# Patient Record
Sex: FEMALE | Race: WHITE | Employment: OTHER | URBAN - METROPOLITAN AREA
[De-identification: names, ages, dates, MRNs, and addresses within clinical notes are randomized per-mention and may not be internally consistent; named-entity substitution may affect disease eponyms.]

---

## 2017-02-01 ENCOUNTER — LAB ENCOUNTER (OUTPATIENT)
Dept: LAB | Facility: HOSPITAL | Age: 67
End: 2017-02-01
Attending: INTERNAL MEDICINE
Payer: MEDICARE

## 2017-02-01 DIAGNOSIS — E10.9 DIABETES MELLITUS TYPE I (HCC): Primary | ICD-10-CM

## 2017-02-01 DIAGNOSIS — E03.9 MYXEDEMA HEART DISEASE: ICD-10-CM

## 2017-02-01 DIAGNOSIS — I51.9 MYXEDEMA HEART DISEASE: ICD-10-CM

## 2017-02-01 DIAGNOSIS — E78.2 MIXED HYPERLIPIDEMIA: ICD-10-CM

## 2017-02-01 LAB
ALBUMIN SERPL BCP-MCNC: 3.6 G/DL (ref 3.5–4.8)
ALBUMIN/GLOB SERPL: 1.3 {RATIO} (ref 1–2)
ALP SERPL-CCNC: 34 U/L (ref 32–100)
ALT SERPL-CCNC: 27 U/L (ref 14–54)
ANION GAP SERPL CALC-SCNC: 6 MMOL/L (ref 0–18)
AST SERPL-CCNC: 31 U/L (ref 15–41)
BILIRUB SERPL-MCNC: 0.7 MG/DL (ref 0.3–1.2)
BUN SERPL-MCNC: 22 MG/DL (ref 8–20)
BUN/CREAT SERPL: 22.7 (ref 10–20)
CALCIUM SERPL-MCNC: 10 MG/DL (ref 8.5–10.5)
CHLORIDE SERPL-SCNC: 104 MMOL/L (ref 95–110)
CHOLEST SERPL-MCNC: 163 MG/DL (ref 110–200)
CO2 SERPL-SCNC: 30 MMOL/L (ref 22–32)
CREAT SERPL-MCNC: 0.97 MG/DL (ref 0.5–1.5)
GLOBULIN PLAS-MCNC: 2.7 G/DL (ref 2.5–3.7)
GLUCOSE SERPL-MCNC: 205 MG/DL (ref 70–99)
HBA1C MFR BLD: 7.6 % (ref 4–6)
HDLC SERPL-MCNC: 64 MG/DL
LDLC SERPL CALC-MCNC: 89 MG/DL (ref 0–99)
NONHDLC SERPL-MCNC: 99 MG/DL
OSMOLALITY UR CALC.SUM OF ELEC: 299 MOSM/KG (ref 275–295)
POTASSIUM SERPL-SCNC: 4.5 MMOL/L (ref 3.3–5.1)
PROT SERPL-MCNC: 6.3 G/DL (ref 5.9–8.4)
SODIUM SERPL-SCNC: 140 MMOL/L (ref 136–144)
T4 FREE SERPL-MCNC: 1.93 NG/DL (ref 0.58–1.64)
TRIGL SERPL-MCNC: 51 MG/DL (ref 1–149)
TSH SERPL-ACNC: 1.6 UIU/ML (ref 0.34–5.6)

## 2017-02-01 PROCEDURE — 84439 ASSAY OF FREE THYROXINE: CPT

## 2017-02-01 PROCEDURE — 80061 LIPID PANEL: CPT

## 2017-02-01 PROCEDURE — 84443 ASSAY THYROID STIM HORMONE: CPT

## 2017-02-01 PROCEDURE — 83036 HEMOGLOBIN GLYCOSYLATED A1C: CPT

## 2017-02-01 PROCEDURE — 80053 COMPREHEN METABOLIC PANEL: CPT

## 2017-02-01 PROCEDURE — 36415 COLL VENOUS BLD VENIPUNCTURE: CPT

## 2017-08-05 ENCOUNTER — LAB ENCOUNTER (OUTPATIENT)
Dept: LAB | Facility: HOSPITAL | Age: 67
End: 2017-08-05
Attending: INTERNAL MEDICINE
Payer: MEDICARE

## 2017-08-05 DIAGNOSIS — E78.2 MIXED HYPERLIPIDEMIA: ICD-10-CM

## 2017-08-05 DIAGNOSIS — E10.9 DIABETES MELLITUS TYPE I (HCC): Primary | ICD-10-CM

## 2017-08-05 LAB
ALBUMIN SERPL BCP-MCNC: 3.6 G/DL (ref 3.5–4.8)
ALBUMIN/GLOB SERPL: 1.4 {RATIO} (ref 1–2)
ALP SERPL-CCNC: 30 U/L (ref 32–100)
ALT SERPL-CCNC: 23 U/L (ref 14–54)
ANION GAP SERPL CALC-SCNC: 6 MMOL/L (ref 0–18)
AST SERPL-CCNC: 25 U/L (ref 15–41)
BILIRUB SERPL-MCNC: 0.4 MG/DL (ref 0.3–1.2)
BUN SERPL-MCNC: 32 MG/DL (ref 8–20)
BUN/CREAT SERPL: 29.9 (ref 10–20)
CALCIUM SERPL-MCNC: 9.5 MG/DL (ref 8.5–10.5)
CHLORIDE SERPL-SCNC: 101 MMOL/L (ref 95–110)
CHOLEST SERPL-MCNC: 148 MG/DL (ref 110–200)
CO2 SERPL-SCNC: 28 MMOL/L (ref 22–32)
CREAT SERPL-MCNC: 1.07 MG/DL (ref 0.5–1.5)
CREAT UR-MCNC: 45.6 MG/DL
GLOBULIN PLAS-MCNC: 2.5 G/DL (ref 2.5–3.7)
GLUCOSE SERPL-MCNC: 382 MG/DL (ref 70–99)
HBA1C MFR BLD: 7.3 % (ref 4–6)
HDLC SERPL-MCNC: 62 MG/DL
LDLC SERPL CALC-MCNC: 79 MG/DL (ref 0–99)
MICROALBUMIN UR-MCNC: 0.5 MG/DL (ref 0–1.8)
MICROALBUMIN/CREAT UR: 11 MG/G{CREAT} (ref 0–20)
NONHDLC SERPL-MCNC: 86 MG/DL
OSMOLALITY UR CALC.SUM OF ELEC: 303 MOSM/KG (ref 275–295)
POTASSIUM SERPL-SCNC: 4.8 MMOL/L (ref 3.3–5.1)
PROT SERPL-MCNC: 6.1 G/DL (ref 5.9–8.4)
SODIUM SERPL-SCNC: 135 MMOL/L (ref 136–144)
TRIGL SERPL-MCNC: 36 MG/DL (ref 1–149)

## 2017-08-05 PROCEDURE — 36415 COLL VENOUS BLD VENIPUNCTURE: CPT

## 2017-08-05 PROCEDURE — 83036 HEMOGLOBIN GLYCOSYLATED A1C: CPT

## 2017-08-05 PROCEDURE — 82570 ASSAY OF URINE CREATININE: CPT

## 2017-08-05 PROCEDURE — 80061 LIPID PANEL: CPT

## 2017-08-05 PROCEDURE — 82043 UR ALBUMIN QUANTITATIVE: CPT

## 2017-08-05 PROCEDURE — 80053 COMPREHEN METABOLIC PANEL: CPT

## 2018-01-30 ENCOUNTER — LAB ENCOUNTER (OUTPATIENT)
Dept: LAB | Facility: HOSPITAL | Age: 68
End: 2018-01-30
Attending: INTERNAL MEDICINE
Payer: MEDICARE

## 2018-01-30 DIAGNOSIS — E10.9 DIABETES MELLITUS TYPE I (HCC): Primary | ICD-10-CM

## 2018-01-30 DIAGNOSIS — E55.9 AVITAMINOSIS D: ICD-10-CM

## 2018-01-30 DIAGNOSIS — E78.2 MIXED HYPERLIPIDEMIA: ICD-10-CM

## 2018-01-30 DIAGNOSIS — E03.9 HYPOTHYROIDISM, ADULT: ICD-10-CM

## 2018-01-30 LAB
ALBUMIN SERPL BCP-MCNC: 3.9 G/DL (ref 3.5–4.8)
ALBUMIN/GLOB SERPL: 1.6 {RATIO} (ref 1–2)
ALP SERPL-CCNC: 31 U/L (ref 32–100)
ALT SERPL-CCNC: 26 U/L (ref 14–54)
ANION GAP SERPL CALC-SCNC: 7 MMOL/L (ref 0–18)
AST SERPL-CCNC: 31 U/L (ref 15–41)
BILIRUB SERPL-MCNC: 0.5 MG/DL (ref 0.3–1.2)
BUN SERPL-MCNC: 19 MG/DL (ref 8–20)
BUN/CREAT SERPL: 18.4 (ref 10–20)
CALCIUM SERPL-MCNC: 9.9 MG/DL (ref 8.5–10.5)
CHLORIDE SERPL-SCNC: 101 MMOL/L (ref 95–110)
CHOLEST SERPL-MCNC: 163 MG/DL (ref 110–200)
CO2 SERPL-SCNC: 29 MMOL/L (ref 22–32)
CREAT SERPL-MCNC: 1.03 MG/DL (ref 0.5–1.5)
GLOBULIN PLAS-MCNC: 2.4 G/DL (ref 2.5–3.7)
GLUCOSE SERPL-MCNC: 147 MG/DL (ref 70–99)
HBA1C MFR BLD: 7.8 % (ref 4–6)
HDLC SERPL-MCNC: 63 MG/DL
LDLC SERPL CALC-MCNC: 89 MG/DL (ref 0–99)
NONHDLC SERPL-MCNC: 100 MG/DL
OSMOLALITY UR CALC.SUM OF ELEC: 289 MOSM/KG (ref 275–295)
POTASSIUM SERPL-SCNC: 4.3 MMOL/L (ref 3.3–5.1)
PROT SERPL-MCNC: 6.3 G/DL (ref 5.9–8.4)
SODIUM SERPL-SCNC: 137 MMOL/L (ref 136–144)
T4 FREE SERPL-MCNC: 1.72 NG/DL (ref 0.58–1.64)
TRIGL SERPL-MCNC: 54 MG/DL (ref 1–149)
TSH SERPL-ACNC: 0.91 UIU/ML (ref 0.45–5.33)

## 2018-01-30 PROCEDURE — 83036 HEMOGLOBIN GLYCOSYLATED A1C: CPT

## 2018-01-30 PROCEDURE — 82306 VITAMIN D 25 HYDROXY: CPT

## 2018-01-30 PROCEDURE — 80053 COMPREHEN METABOLIC PANEL: CPT

## 2018-01-30 PROCEDURE — 84443 ASSAY THYROID STIM HORMONE: CPT

## 2018-01-30 PROCEDURE — 80061 LIPID PANEL: CPT

## 2018-01-30 PROCEDURE — 84439 ASSAY OF FREE THYROXINE: CPT

## 2018-01-30 PROCEDURE — 36415 COLL VENOUS BLD VENIPUNCTURE: CPT

## 2018-01-31 LAB — 25(OH)D3 SERPL-MCNC: 73.7 NG/ML

## 2018-05-19 ENCOUNTER — HOSPITAL ENCOUNTER (OUTPATIENT)
Dept: MAMMOGRAPHY | Facility: HOSPITAL | Age: 68
Discharge: HOME | End: 2018-05-19
Attending: INTERNAL MEDICINE
Payer: MEDICARE

## 2018-05-19 DIAGNOSIS — Z12.31 ENCOUNTER FOR SCREENING MAMMOGRAM FOR MALIGNANT NEOPLASM OF BREAST: ICD-10-CM

## 2018-05-19 PROCEDURE — 77067 SCR MAMMO BI INCL CAD: CPT | Performed by: INTERNAL MEDICINE

## 2018-05-19 PROCEDURE — 77063 BREAST TOMOSYNTHESIS BI: CPT | Performed by: INTERNAL MEDICINE

## 2018-06-18 ENCOUNTER — HOSPITAL ENCOUNTER (OUTPATIENT)
Dept: ENDOCRINOLOGY | Facility: HOSPITAL | Age: 68
Discharge: HOME | End: 2018-06-18
Attending: INTERNAL MEDICINE
Payer: MEDICARE

## 2018-06-18 DIAGNOSIS — E10.3293 TYPE 1 DIABETES MELLITUS WITH MILD NONPROLIFERATIVE RETINOPATHY OF BOTH EYES WITHOUT MACULAR EDEMA (HCC): Primary | ICD-10-CM

## 2018-06-19 VITALS — WEIGHT: 122.19 LBS | BODY MASS INDEX: 21 KG/M2

## 2018-06-19 NOTE — PROGRESS NOTES
Jolie Hamilton 1/5/1950 attended for Intensive Management:    Date: 6/19/2018 Start Time: 3:15 pm End Time: 5 pm    Wt: Wt Readings from Last 6 Encounters:  06/19/18 : 122 lb 3.2 oz  02/02/18 : 121 lb 14.4 oz  09/27/17 : 125 lb  06/21/17 : 122 lb 3.2 oz Other: Pt was very overwhelmed at this appointment. Her  was very helpful. Educator provided simple guidelines and encouragement. Education:    Reviewed basic pathophysiology of diabetes, some of the differences between type 1 and type 2.

## 2018-06-28 ENCOUNTER — APPOINTMENT (OUTPATIENT)
Dept: ENDOCRINOLOGY | Facility: HOSPITAL | Age: 68
End: 2018-06-28
Attending: INTERNAL MEDICINE
Payer: MEDICARE

## 2018-07-28 ENCOUNTER — LAB ENCOUNTER (OUTPATIENT)
Dept: LAB | Facility: HOSPITAL | Age: 68
End: 2018-07-28
Attending: INTERNAL MEDICINE
Payer: MEDICARE

## 2018-07-28 DIAGNOSIS — E03.9 MYXEDEMA HEART DISEASE: ICD-10-CM

## 2018-07-28 DIAGNOSIS — I51.9 MYXEDEMA HEART DISEASE: ICD-10-CM

## 2018-07-28 DIAGNOSIS — E10.9 DIABETES MELLITUS TYPE I (HCC): Primary | ICD-10-CM

## 2018-07-28 DIAGNOSIS — E78.2 MIXED HYPERLIPIDEMIA: ICD-10-CM

## 2018-07-28 LAB
ALBUMIN SERPL BCP-MCNC: 3.7 G/DL (ref 3.5–4.8)
ALBUMIN/GLOB SERPL: 1.5 {RATIO} (ref 1–2)
ALP SERPL-CCNC: 33 U/L (ref 32–100)
ALT SERPL-CCNC: 29 U/L (ref 14–54)
ANION GAP SERPL CALC-SCNC: 7 MMOL/L (ref 0–18)
AST SERPL-CCNC: 30 U/L (ref 15–41)
BILIRUB SERPL-MCNC: 0.6 MG/DL (ref 0.3–1.2)
BUN SERPL-MCNC: 28 MG/DL (ref 8–20)
BUN/CREAT SERPL: 24.1 (ref 10–20)
CALCIUM SERPL-MCNC: 10 MG/DL (ref 8.5–10.5)
CHLORIDE SERPL-SCNC: 101 MMOL/L (ref 95–110)
CHOLEST SERPL-MCNC: 163 MG/DL (ref 110–200)
CO2 SERPL-SCNC: 28 MMOL/L (ref 22–32)
CREAT SERPL-MCNC: 1.16 MG/DL (ref 0.5–1.5)
CREAT UR-MCNC: 40.4 MG/DL
GLOBULIN PLAS-MCNC: 2.5 G/DL (ref 2.5–3.7)
GLUCOSE SERPL-MCNC: 221 MG/DL (ref 70–99)
HBA1C MFR BLD: 7.8 % (ref 4–6)
HDLC SERPL-MCNC: 62 MG/DL
LDLC SERPL CALC-MCNC: 94 MG/DL (ref 0–99)
MICROALBUMIN UR-MCNC: 2 MG/DL (ref 0–1.8)
MICROALBUMIN/CREAT UR: 49.5 MG/G{CREAT} (ref 0–20)
NONHDLC SERPL-MCNC: 101 MG/DL
OSMOLALITY UR CALC.SUM OF ELEC: 294 MOSM/KG (ref 275–295)
PATIENT FASTING: YES
POTASSIUM SERPL-SCNC: 4.7 MMOL/L (ref 3.3–5.1)
PROT SERPL-MCNC: 6.2 G/DL (ref 5.9–8.4)
SODIUM SERPL-SCNC: 136 MMOL/L (ref 136–144)
TRIGL SERPL-MCNC: 37 MG/DL (ref 1–149)

## 2018-07-28 PROCEDURE — 82043 UR ALBUMIN QUANTITATIVE: CPT

## 2018-07-28 PROCEDURE — 36415 COLL VENOUS BLD VENIPUNCTURE: CPT

## 2018-07-28 PROCEDURE — 80053 COMPREHEN METABOLIC PANEL: CPT

## 2018-07-28 PROCEDURE — 82570 ASSAY OF URINE CREATININE: CPT

## 2018-07-28 PROCEDURE — 83036 HEMOGLOBIN GLYCOSYLATED A1C: CPT

## 2018-07-28 PROCEDURE — 80061 LIPID PANEL: CPT

## 2019-02-09 ENCOUNTER — LAB ENCOUNTER (OUTPATIENT)
Dept: LAB | Facility: HOSPITAL | Age: 69
End: 2019-02-09
Attending: INTERNAL MEDICINE
Payer: MEDICARE

## 2019-02-09 DIAGNOSIS — E10.9 DIABETES MELLITUS TYPE I (HCC): Primary | ICD-10-CM

## 2019-02-09 DIAGNOSIS — Z13.29 ENCOUNTER FOR SCREENING FOR ENDOCRINE DISORDER: ICD-10-CM

## 2019-02-09 DIAGNOSIS — Z13.228 ENCOUNTER FOR SCREENING FOR METABOLIC DISORDER: ICD-10-CM

## 2019-02-09 DIAGNOSIS — E10.3393 TYPE 1 DIABETES MELLITUS WITH MODERATE NONPROLIFERATIVE RETINOPATHY OF BOTH EYES WITHOUT MACULAR EDEMA (HCC): ICD-10-CM

## 2019-02-09 DIAGNOSIS — E10.65 UNCONTROLLED TYPE 1 DIABETES MELLITUS WITH HYPERGLYCEMIA (HCC): ICD-10-CM

## 2019-02-09 LAB
ALBUMIN SERPL BCP-MCNC: 3.7 G/DL (ref 3.5–4.8)
ALBUMIN/GLOB SERPL: 1.5 {RATIO} (ref 1–2)
ALP SERPL-CCNC: 38 U/L (ref 32–100)
ALT SERPL-CCNC: 33 U/L (ref 14–54)
ANION GAP SERPL CALC-SCNC: 8 MMOL/L (ref 0–18)
AST SERPL-CCNC: 32 U/L (ref 15–41)
BILIRUB SERPL-MCNC: 0.6 MG/DL (ref 0.3–1.2)
BUN SERPL-MCNC: 24 MG/DL (ref 8–20)
BUN/CREAT SERPL: 23.5 (ref 10–20)
CALCIUM SERPL-MCNC: 9.9 MG/DL (ref 8.5–10.5)
CHLORIDE SERPL-SCNC: 107 MMOL/L (ref 95–110)
CHOLEST SERPL-MCNC: 148 MG/DL (ref 110–200)
CO2 SERPL-SCNC: 27 MMOL/L (ref 22–32)
CREAT SERPL-MCNC: 1.02 MG/DL (ref 0.5–1.5)
CREAT UR-MCNC: 33.8 MG/DL
EST. AVERAGE GLUCOSE BLD GHB EST-MCNC: 183 MG/DL (ref 68–126)
GLOBULIN PLAS-MCNC: 2.5 G/DL (ref 2.5–3.7)
GLUCOSE SERPL-MCNC: 169 MG/DL (ref 70–99)
HBA1C MFR BLD HPLC: 8 % (ref ?–5.7)
HDLC SERPL-MCNC: 65 MG/DL
LDLC SERPL CALC-MCNC: 75 MG/DL (ref 0–99)
MICROALBUMIN UR-MCNC: 3.2 MG/DL (ref 0–1.8)
MICROALBUMIN/CREAT UR: 94.7 MG/G{CREAT} (ref 0–20)
NONHDLC SERPL-MCNC: 83 MG/DL
OSMOLALITY UR CALC.SUM OF ELEC: 302 MOSM/KG (ref 275–295)
PATIENT FASTING: YES
POTASSIUM SERPL-SCNC: 4.5 MMOL/L (ref 3.3–5.1)
PROT SERPL-MCNC: 6.2 G/DL (ref 5.9–8.4)
SODIUM SERPL-SCNC: 142 MMOL/L (ref 136–144)
TRIGL SERPL-MCNC: 39 MG/DL (ref 1–149)
TSH SERPL-ACNC: 1.82 UIU/ML (ref 0.45–5.33)

## 2019-02-09 PROCEDURE — 85025 COMPLETE CBC W/AUTO DIFF WBC: CPT | Performed by: INTERNAL MEDICINE

## 2019-02-09 PROCEDURE — 82043 UR ALBUMIN QUANTITATIVE: CPT

## 2019-02-09 PROCEDURE — 36415 COLL VENOUS BLD VENIPUNCTURE: CPT

## 2019-02-09 PROCEDURE — 83036 HEMOGLOBIN GLYCOSYLATED A1C: CPT

## 2019-02-09 PROCEDURE — 82570 ASSAY OF URINE CREATININE: CPT

## 2019-02-09 PROCEDURE — 36415 COLL VENOUS BLD VENIPUNCTURE: CPT | Performed by: INTERNAL MEDICINE

## 2019-02-09 PROCEDURE — 80061 LIPID PANEL: CPT

## 2019-02-09 PROCEDURE — 80053 COMPREHEN METABOLIC PANEL: CPT

## 2019-02-09 PROCEDURE — 83036 HEMOGLOBIN GLYCOSYLATED A1C: CPT | Performed by: INTERNAL MEDICINE

## 2019-02-09 PROCEDURE — 84443 ASSAY THYROID STIM HORMONE: CPT

## 2019-06-01 ENCOUNTER — LAB ENCOUNTER (OUTPATIENT)
Dept: LAB | Facility: HOSPITAL | Age: 69
End: 2019-06-01
Attending: INTERNAL MEDICINE
Payer: MEDICARE

## 2019-06-01 DIAGNOSIS — E78.2 MIXED HYPERLIPIDEMIA: ICD-10-CM

## 2019-06-01 DIAGNOSIS — E10.9 DIABETES MELLITUS TYPE I (HCC): Primary | ICD-10-CM

## 2019-06-01 PROCEDURE — 80053 COMPREHEN METABOLIC PANEL: CPT

## 2019-06-01 PROCEDURE — 83036 HEMOGLOBIN GLYCOSYLATED A1C: CPT

## 2019-06-01 PROCEDURE — 36415 COLL VENOUS BLD VENIPUNCTURE: CPT

## 2019-06-01 PROCEDURE — 80061 LIPID PANEL: CPT

## 2019-09-28 ENCOUNTER — HOSPITAL ENCOUNTER (OUTPATIENT)
Dept: MAMMOGRAPHY | Facility: HOSPITAL | Age: 69
Discharge: HOME | End: 2019-09-28
Attending: INTERNAL MEDICINE
Payer: MEDICARE

## 2019-09-28 DIAGNOSIS — Z12.31 SCREENING MAMMOGRAM FOR HIGH-RISK PATIENT: ICD-10-CM

## 2019-09-28 PROCEDURE — 77063 BREAST TOMOSYNTHESIS BI: CPT | Performed by: INTERNAL MEDICINE

## 2019-09-28 PROCEDURE — 77067 SCR MAMMO BI INCL CAD: CPT | Performed by: INTERNAL MEDICINE

## 2019-10-25 ENCOUNTER — HOSPITAL ENCOUNTER (EMERGENCY)
Facility: HOSPITAL | Age: 69
Discharge: HOME | End: 2019-10-25
Attending: PHYSICIAN ASSISTANT
Payer: MEDICARE

## 2019-10-25 VITALS
WEIGHT: 123.44 LBS | BODY MASS INDEX: 21 KG/M2 | RESPIRATION RATE: 16 BRPM | OXYGEN SATURATION: 99 % | DIASTOLIC BLOOD PRESSURE: 66 MMHG | TEMPERATURE: 99 F | SYSTOLIC BLOOD PRESSURE: 157 MMHG | HEART RATE: 60 BPM

## 2019-10-25 DIAGNOSIS — S61.211A LACERATION OF LEFT INDEX FINGER WITHOUT FOREIGN BODY WITHOUT DAMAGE TO NAIL, INITIAL ENCOUNTER: Primary | ICD-10-CM

## 2019-10-25 DIAGNOSIS — R03.0 ELEVATED BLOOD PRESSURE READING: ICD-10-CM

## 2019-10-25 PROCEDURE — 90471 IMMUNIZATION ADMIN: CPT

## 2019-10-25 PROCEDURE — 99283 EMERGENCY DEPT VISIT LOW MDM: CPT

## 2019-10-25 PROCEDURE — 12001 RPR S/N/AX/GEN/TRNK 2.5CM/<: CPT

## 2019-10-25 RX ORDER — CEPHALEXIN 500 MG/1
500 CAPSULE ORAL 3 TIMES DAILY
Qty: 15 CAPSULE | Refills: 0 | Status: SHIPPED | OUTPATIENT
Start: 2019-10-25 | End: 2019-10-30

## 2019-10-25 NOTE — ED NOTES
Assumed care of patient from triage. Patient to ED from home for laceration. Patient states that she was cutting vegetables with knife when she accidentally cut her left second finger. Wound asepsis performed by Luis Armando Kessler. ERT.  Laceration covered with 4x4'

## 2019-10-25 NOTE — ED PROVIDER NOTES
Patient Seen in: [facility] AND [facility] Emergency Department    History   Patient presents with:  Laceration Abrasion (integumentary)    Stated Complaint:     HPI      HPI:     68year old female who presents with chief complaint of laceration of left index f EVERY DAY   furosemide 20 MG Oral Tab,  Take 20 mg by mouth as needed. Insulin Aspart (NOVOLOG FLEXPEN SC),  Inject into the skin. Sliding scale   Cholecalciferol (HM VITAMIN D3) 4000 units Oral Cap,  Take 1 capsule by mouth every 7 days.    Vitamin B-12 Capillary refill is brisk. No extremity edema. Gastrointestinal: Abdomen soft, nontender, nondistended. There is no rebound tenderness or guarding. No organomegaly is noted. No guarding or peritoneal signs. Genitourinary: Not examined.   Lymphatic: No Education regarding lifestyle modifications and the need for appropriate follow-up with their PCP to have their blood pressure re-checked within 24-48 hours was provided. Patient case discussed with and patient seen by Dr. Mai Almanza.     Disposition and P

## 2020-01-04 ENCOUNTER — LAB ENCOUNTER (OUTPATIENT)
Dept: LAB | Facility: HOSPITAL | Age: 70
End: 2020-01-04
Attending: INTERNAL MEDICINE
Payer: MEDICARE

## 2020-01-04 DIAGNOSIS — E55.9 VITAMIN D DEFICIENCY: ICD-10-CM

## 2020-01-04 DIAGNOSIS — E10.9 DIABETES MELLITUS TYPE I (HCC): ICD-10-CM

## 2020-01-04 DIAGNOSIS — E78.2 MIXED HYPERLIPIDEMIA: ICD-10-CM

## 2020-01-04 DIAGNOSIS — I51.9 MYXEDEMA HEART DISEASE: Primary | ICD-10-CM

## 2020-01-04 DIAGNOSIS — E03.9 MYXEDEMA HEART DISEASE: Primary | ICD-10-CM

## 2020-01-04 LAB
ALBUMIN SERPL-MCNC: 3.7 G/DL (ref 3.4–5)
ALBUMIN/GLOB SERPL: 1.2 {RATIO} (ref 1–2)
ALP LIVER SERPL-CCNC: 38 U/L (ref 55–142)
ALT SERPL-CCNC: 38 U/L (ref 13–56)
ANION GAP SERPL CALC-SCNC: 5 MMOL/L (ref 0–18)
AST SERPL-CCNC: 40 U/L (ref 15–37)
BILIRUB SERPL-MCNC: 0.3 MG/DL (ref 0.1–2)
BUN BLD-MCNC: 27 MG/DL (ref 7–18)
BUN/CREAT SERPL: 27.8 (ref 10–20)
CALCIUM BLD-MCNC: 10.1 MG/DL (ref 8.5–10.1)
CHLORIDE SERPL-SCNC: 107 MMOL/L (ref 98–112)
CHOLEST SMN-MCNC: 154 MG/DL (ref ?–200)
CO2 SERPL-SCNC: 30 MMOL/L (ref 21–32)
CREAT BLD-MCNC: 0.97 MG/DL (ref 0.55–1.02)
CREAT UR-SCNC: 36.8 MG/DL
GLOBULIN PLAS-MCNC: 3 G/DL (ref 2.8–4.4)
GLUCOSE BLD-MCNC: 135 MG/DL (ref 70–99)
HDLC SERPL-MCNC: 74 MG/DL (ref 40–59)
LDLC SERPL CALC-MCNC: 72 MG/DL (ref ?–100)
M PROTEIN MFR SERPL ELPH: 6.7 G/DL (ref 6.4–8.2)
MICROALBUMIN UR-MCNC: 8.32 MG/DL
MICROALBUMIN/CREAT 24H UR-RTO: 226.1 UG/MG (ref ?–30)
NONHDLC SERPL-MCNC: 80 MG/DL (ref ?–130)
OSMOLALITY SERPL CALC.SUM OF ELEC: 301 MOSM/KG (ref 275–295)
PATIENT FASTING Y/N/NP: YES
PATIENT FASTING Y/N/NP: YES
POTASSIUM SERPL-SCNC: 3.9 MMOL/L (ref 3.5–5.1)
SODIUM SERPL-SCNC: 142 MMOL/L (ref 136–145)
T4 FREE SERPL-MCNC: 1.1 NG/DL (ref 0.8–1.7)
TRIGL SERPL-MCNC: 41 MG/DL (ref 30–149)
TSI SER-ACNC: 3.07 MIU/ML (ref 0.36–3.74)
VLDLC SERPL CALC-MCNC: 8 MG/DL (ref 0–30)

## 2020-01-04 PROCEDURE — 82306 VITAMIN D 25 HYDROXY: CPT

## 2020-01-04 PROCEDURE — 84443 ASSAY THYROID STIM HORMONE: CPT

## 2020-01-04 PROCEDURE — 80053 COMPREHEN METABOLIC PANEL: CPT

## 2020-01-04 PROCEDURE — 84439 ASSAY OF FREE THYROXINE: CPT

## 2020-01-04 PROCEDURE — 82043 UR ALBUMIN QUANTITATIVE: CPT

## 2020-01-04 PROCEDURE — 36415 COLL VENOUS BLD VENIPUNCTURE: CPT

## 2020-01-04 PROCEDURE — 82570 ASSAY OF URINE CREATININE: CPT

## 2020-01-04 PROCEDURE — 80061 LIPID PANEL: CPT

## 2020-01-06 LAB — 25(OH)D3 SERPL-MCNC: 84.7 NG/ML (ref 30–100)

## 2020-01-08 ENCOUNTER — APPOINTMENT (OUTPATIENT)
Dept: LAB | Facility: HOSPITAL | Age: 70
End: 2020-01-08
Attending: INTERNAL MEDICINE
Payer: MEDICARE

## 2020-01-08 DIAGNOSIS — I10 ESSENTIAL HYPERTENSION: ICD-10-CM

## 2020-01-08 DIAGNOSIS — E10.3293 TYPE 1 DIABETES MELLITUS WITH MILD NONPROLIFERATIVE RETINOPATHY OF BOTH EYES WITHOUT MACULAR EDEMA (HCC): ICD-10-CM

## 2020-01-08 LAB
ALBUMIN SERPL-MCNC: 3.6 G/DL (ref 3.4–5)
ALBUMIN/GLOB SERPL: 1.2 {RATIO} (ref 1–2)
ALP LIVER SERPL-CCNC: 38 U/L (ref 55–142)
ALT SERPL-CCNC: 38 U/L (ref 13–56)
ANION GAP SERPL CALC-SCNC: 4 MMOL/L (ref 0–18)
AST SERPL-CCNC: 27 U/L (ref 15–37)
BILIRUB SERPL-MCNC: 0.3 MG/DL (ref 0.1–2)
BUN BLD-MCNC: 26 MG/DL (ref 7–18)
BUN/CREAT SERPL: 29.9 (ref 10–20)
CALCIUM BLD-MCNC: 9.6 MG/DL (ref 8.5–10.1)
CHLORIDE SERPL-SCNC: 107 MMOL/L (ref 98–112)
CO2 SERPL-SCNC: 29 MMOL/L (ref 21–32)
CREAT BLD-MCNC: 0.87 MG/DL (ref 0.55–1.02)
EST. AVERAGE GLUCOSE BLD GHB EST-MCNC: 177 MG/DL (ref 68–126)
GLOBULIN PLAS-MCNC: 2.9 G/DL (ref 2.8–4.4)
GLUCOSE BLD-MCNC: 107 MG/DL (ref 70–99)
HBA1C MFR BLD HPLC: 7.8 % (ref ?–5.7)
M PROTEIN MFR SERPL ELPH: 6.5 G/DL (ref 6.4–8.2)
OSMOLALITY SERPL CALC.SUM OF ELEC: 295 MOSM/KG (ref 275–295)
PATIENT FASTING Y/N/NP: YES
POTASSIUM SERPL-SCNC: 4.2 MMOL/L (ref 3.5–5.1)
SODIUM SERPL-SCNC: 140 MMOL/L (ref 136–145)
TSI SER-ACNC: 1.85 MIU/ML (ref 0.36–3.74)

## 2020-01-08 PROCEDURE — 36415 COLL VENOUS BLD VENIPUNCTURE: CPT

## 2020-01-08 PROCEDURE — 80053 COMPREHEN METABOLIC PANEL: CPT

## 2020-01-08 PROCEDURE — 84443 ASSAY THYROID STIM HORMONE: CPT

## 2020-01-08 PROCEDURE — 83036 HEMOGLOBIN GLYCOSYLATED A1C: CPT

## 2020-07-29 ENCOUNTER — LAB ENCOUNTER (OUTPATIENT)
Dept: LAB | Facility: HOSPITAL | Age: 70
End: 2020-07-29
Attending: INTERNAL MEDICINE
Payer: MEDICARE

## 2020-07-29 DIAGNOSIS — E78.2 MIXED HYPERLIPIDEMIA: ICD-10-CM

## 2020-07-29 DIAGNOSIS — E06.3 HYPOTHYROIDISM DUE TO HASHIMOTO'S THYROIDITIS: ICD-10-CM

## 2020-07-29 DIAGNOSIS — E03.8 HYPOTHYROIDISM DUE TO HASHIMOTO'S THYROIDITIS: ICD-10-CM

## 2020-07-29 DIAGNOSIS — E10.9 DIABETES MELLITUS TYPE I (HCC): Primary | ICD-10-CM

## 2020-07-29 LAB
ALBUMIN SERPL-MCNC: 3.7 G/DL (ref 3.4–5)
ALBUMIN/GLOB SERPL: 1.2 {RATIO} (ref 1–2)
ALP LIVER SERPL-CCNC: 41 U/L (ref 55–142)
ALT SERPL-CCNC: 32 U/L (ref 13–56)
ANION GAP SERPL CALC-SCNC: 2 MMOL/L (ref 0–18)
AST SERPL-CCNC: 26 U/L (ref 15–37)
BILIRUB SERPL-MCNC: 0.4 MG/DL (ref 0.1–2)
BUN BLD-MCNC: 28 MG/DL (ref 7–18)
BUN/CREAT SERPL: 28.9 (ref 10–20)
CALCIUM BLD-MCNC: 9.6 MG/DL (ref 8.5–10.1)
CHLORIDE SERPL-SCNC: 107 MMOL/L (ref 98–112)
CHOLEST SMN-MCNC: 146 MG/DL (ref ?–200)
CO2 SERPL-SCNC: 31 MMOL/L (ref 21–32)
CREAT BLD-MCNC: 0.97 MG/DL (ref 0.55–1.02)
CREAT UR-SCNC: 37.4 MG/DL
EST. AVERAGE GLUCOSE BLD GHB EST-MCNC: 169 MG/DL (ref 68–126)
GLOBULIN PLAS-MCNC: 3 G/DL (ref 2.8–4.4)
GLUCOSE BLD-MCNC: 130 MG/DL (ref 70–99)
HBA1C MFR BLD HPLC: 7.5 % (ref ?–5.7)
HDLC SERPL-MCNC: 66 MG/DL (ref 40–59)
LDLC SERPL CALC-MCNC: 72 MG/DL (ref ?–100)
M PROTEIN MFR SERPL ELPH: 6.7 G/DL (ref 6.4–8.2)
MICROALBUMIN UR-MCNC: 7.38 MG/DL
MICROALBUMIN/CREAT 24H UR-RTO: 197.3 UG/MG (ref ?–30)
NONHDLC SERPL-MCNC: 80 MG/DL (ref ?–130)
OSMOLALITY SERPL CALC.SUM OF ELEC: 297 MOSM/KG (ref 275–295)
PATIENT FASTING Y/N/NP: YES
PATIENT FASTING Y/N/NP: YES
POTASSIUM SERPL-SCNC: 4.4 MMOL/L (ref 3.5–5.1)
SODIUM SERPL-SCNC: 140 MMOL/L (ref 136–145)
T4 FREE SERPL-MCNC: 1.3 NG/DL (ref 0.8–1.7)
TRIGL SERPL-MCNC: 41 MG/DL (ref 30–149)
TSI SER-ACNC: 1.53 MIU/ML (ref 0.36–3.74)
VLDLC SERPL CALC-MCNC: 8 MG/DL (ref 0–30)

## 2020-07-29 PROCEDURE — 82570 ASSAY OF URINE CREATININE: CPT

## 2020-07-29 PROCEDURE — 80061 LIPID PANEL: CPT

## 2020-07-29 PROCEDURE — 80053 COMPREHEN METABOLIC PANEL: CPT

## 2020-07-29 PROCEDURE — 36415 COLL VENOUS BLD VENIPUNCTURE: CPT

## 2020-07-29 PROCEDURE — 83036 HEMOGLOBIN GLYCOSYLATED A1C: CPT

## 2020-07-29 PROCEDURE — 84439 ASSAY OF FREE THYROXINE: CPT

## 2020-07-29 PROCEDURE — 82043 UR ALBUMIN QUANTITATIVE: CPT

## 2020-07-29 PROCEDURE — 84443 ASSAY THYROID STIM HORMONE: CPT

## 2020-11-21 ENCOUNTER — HOSPITAL ENCOUNTER (OUTPATIENT)
Dept: MAMMOGRAPHY | Facility: HOSPITAL | Age: 70
Discharge: HOME | End: 2020-11-21
Attending: INTERNAL MEDICINE
Payer: MEDICARE

## 2020-11-21 DIAGNOSIS — Z12.31 ENCOUNTER FOR SCREENING MAMMOGRAM FOR MALIGNANT NEOPLASM OF BREAST: ICD-10-CM

## 2020-11-21 PROCEDURE — 77063 BREAST TOMOSYNTHESIS BI: CPT | Performed by: INTERNAL MEDICINE

## 2020-11-21 PROCEDURE — 77067 SCR MAMMO BI INCL CAD: CPT | Performed by: INTERNAL MEDICINE

## 2021-01-14 ENCOUNTER — HOSPITAL ENCOUNTER (OUTPATIENT)
Dept: GENERAL RADIOLOGY | Facility: HOSPITAL | Age: 71
Discharge: HOME | End: 2021-01-14
Attending: INTERNAL MEDICINE
Payer: MEDICARE

## 2021-01-14 DIAGNOSIS — M25.552 LEFT HIP PAIN: ICD-10-CM

## 2021-01-16 ENCOUNTER — LAB ENCOUNTER (OUTPATIENT)
Dept: LAB | Facility: HOSPITAL | Age: 71
End: 2021-01-16
Attending: INTERNAL MEDICINE
Payer: MEDICARE

## 2021-01-16 DIAGNOSIS — E78.5 HYPERLIPIDEMIA: ICD-10-CM

## 2021-01-16 DIAGNOSIS — E03.9 MYXEDEMA HEART DISEASE: ICD-10-CM

## 2021-01-16 DIAGNOSIS — I51.9 MYXEDEMA HEART DISEASE: ICD-10-CM

## 2021-01-16 DIAGNOSIS — E10.9 DIABETES MELLITUS TYPE I (HCC): Primary | ICD-10-CM

## 2021-01-16 LAB
ALBUMIN SERPL-MCNC: 3.5 G/DL (ref 3.4–5)
ALBUMIN/GLOB SERPL: 1.2 {RATIO} (ref 1–2)
ALP LIVER SERPL-CCNC: 37 U/L
ALT SERPL-CCNC: 33 U/L
ANION GAP SERPL CALC-SCNC: 1 MMOL/L (ref 0–18)
AST SERPL-CCNC: 27 U/L (ref 15–37)
BILIRUB SERPL-MCNC: 0.3 MG/DL (ref 0.1–2)
BUN BLD-MCNC: 23 MG/DL (ref 7–18)
BUN/CREAT SERPL: 25.3 (ref 10–20)
CALCIUM BLD-MCNC: 9.9 MG/DL (ref 8.5–10.1)
CHLORIDE SERPL-SCNC: 109 MMOL/L (ref 98–112)
CHOLEST SMN-MCNC: 128 MG/DL (ref ?–200)
CO2 SERPL-SCNC: 30 MMOL/L (ref 21–32)
CREAT BLD-MCNC: 0.91 MG/DL
EST. AVERAGE GLUCOSE BLD GHB EST-MCNC: 166 MG/DL (ref 68–126)
GLOBULIN PLAS-MCNC: 2.9 G/DL (ref 2.8–4.4)
GLUCOSE BLD-MCNC: 97 MG/DL (ref 70–99)
HBA1C MFR BLD HPLC: 7.4 % (ref ?–5.7)
HDLC SERPL-MCNC: 67 MG/DL (ref 40–59)
LDLC SERPL CALC-MCNC: 53 MG/DL (ref ?–100)
M PROTEIN MFR SERPL ELPH: 6.4 G/DL (ref 6.4–8.2)
NONHDLC SERPL-MCNC: 61 MG/DL (ref ?–130)
OSMOLALITY SERPL CALC.SUM OF ELEC: 294 MOSM/KG (ref 275–295)
PATIENT FASTING Y/N/NP: YES
PATIENT FASTING Y/N/NP: YES
POTASSIUM SERPL-SCNC: 4.3 MMOL/L (ref 3.5–5.1)
SODIUM SERPL-SCNC: 140 MMOL/L (ref 136–145)
T4 FREE SERPL-MCNC: 1.2 NG/DL (ref 0.8–1.7)
TRIGL SERPL-MCNC: 40 MG/DL (ref 30–149)
TSI SER-ACNC: 2.52 MIU/ML (ref 0.36–3.74)
VLDLC SERPL CALC-MCNC: 8 MG/DL (ref 0–30)

## 2021-01-16 PROCEDURE — 36415 COLL VENOUS BLD VENIPUNCTURE: CPT

## 2021-01-16 PROCEDURE — 80053 COMPREHEN METABOLIC PANEL: CPT

## 2021-01-16 PROCEDURE — 84443 ASSAY THYROID STIM HORMONE: CPT

## 2021-01-16 PROCEDURE — 83036 HEMOGLOBIN GLYCOSYLATED A1C: CPT

## 2021-01-16 PROCEDURE — 84439 ASSAY OF FREE THYROXINE: CPT

## 2021-01-16 PROCEDURE — 80061 LIPID PANEL: CPT

## 2021-01-21 ENCOUNTER — HOSPITAL ENCOUNTER (OUTPATIENT)
Dept: GENERAL RADIOLOGY | Facility: HOSPITAL | Age: 71
Discharge: HOME | End: 2021-01-21
Attending: INTERNAL MEDICINE
Payer: MEDICARE

## 2021-01-21 PROCEDURE — 73502 X-RAY EXAM HIP UNI 2-3 VIEWS: CPT | Performed by: INTERNAL MEDICINE

## 2021-07-12 ENCOUNTER — HOSPITAL ENCOUNTER (EMERGENCY)
Facility: HOSPITAL | Age: 71
Discharge: HOME | End: 2021-07-12
Attending: EMERGENCY MEDICINE
Payer: MEDICARE

## 2021-07-12 VITALS
HEIGHT: 63 IN | DIASTOLIC BLOOD PRESSURE: 60 MMHG | TEMPERATURE: 99 F | HEART RATE: 95 BPM | RESPIRATION RATE: 26 BRPM | SYSTOLIC BLOOD PRESSURE: 150 MMHG | WEIGHT: 125 LBS | BODY MASS INDEX: 22.15 KG/M2 | OXYGEN SATURATION: 96 %

## 2021-07-12 DIAGNOSIS — R73.9 HYPERGLYCEMIA: Primary | ICD-10-CM

## 2021-07-12 LAB
ANION GAP SERPL CALC-SCNC: 9 MMOL/L (ref 0–18)
BASOPHILS # BLD AUTO: 0.04 X10(3) UL (ref 0–0.2)
BASOPHILS NFR BLD AUTO: 0.3 %
BILIRUB UR QL: NEGATIVE
BUN BLD-MCNC: 20 MG/DL (ref 7–18)
BUN/CREAT SERPL: 16.9 (ref 10–20)
CALCIUM BLD-MCNC: 10.1 MG/DL (ref 8.5–10.1)
CHLORIDE SERPL-SCNC: 97 MMOL/L (ref 98–112)
CLARITY UR: CLEAR
CO2 SERPL-SCNC: 26 MMOL/L (ref 21–32)
COLOR UR: YELLOW
CREAT BLD-MCNC: 1.18 MG/DL
DEPRECATED RDW RBC AUTO: 45.7 FL (ref 35.1–46.3)
EOSINOPHIL # BLD AUTO: 0 X10(3) UL (ref 0–0.7)
EOSINOPHIL NFR BLD AUTO: 0 %
ERYTHROCYTE [DISTWIDTH] IN BLOOD BY AUTOMATED COUNT: 13.3 % (ref 11–15)
GLUCOSE BLD-MCNC: 334 MG/DL (ref 70–99)
GLUCOSE BLDC GLUCOMTR-MCNC: 284 MG/DL (ref 70–99)
GLUCOSE BLDC GLUCOMTR-MCNC: 291 MG/DL (ref 70–99)
GLUCOSE BLDC GLUCOMTR-MCNC: 318 MG/DL (ref 70–99)
GLUCOSE BLDC GLUCOMTR-MCNC: 366 MG/DL (ref 70–99)
GLUCOSE UR-MCNC: >=500 MG/DL
HCT VFR BLD AUTO: 37 %
HGB BLD-MCNC: 12.2 G/DL
HGB UR QL STRIP.AUTO: NEGATIVE
IMM GRANULOCYTES # BLD AUTO: 0.05 X10(3) UL (ref 0–1)
IMM GRANULOCYTES NFR BLD: 0.4 %
KETONES UR-MCNC: 20 MG/DL
LYMPHOCYTES # BLD AUTO: 1.29 X10(3) UL (ref 1–4)
LYMPHOCYTES NFR BLD AUTO: 9.1 %
MCH RBC QN AUTO: 30.7 PG (ref 26–34)
MCHC RBC AUTO-ENTMCNC: 33 G/DL (ref 31–37)
MCV RBC AUTO: 93.2 FL
MONOCYTES # BLD AUTO: 1.05 X10(3) UL (ref 0.1–1)
MONOCYTES NFR BLD AUTO: 7.4 %
NEUTROPHILS # BLD AUTO: 11.8 X10 (3) UL (ref 1.5–7.7)
NEUTROPHILS # BLD AUTO: 11.8 X10(3) UL (ref 1.5–7.7)
NEUTROPHILS NFR BLD AUTO: 82.8 %
NITRITE UR QL STRIP.AUTO: NEGATIVE
OSMOLALITY SERPL CALC.SUM OF ELEC: 290 MOSM/KG (ref 275–295)
PH UR: 6 [PH] (ref 5–8)
PLATELET # BLD AUTO: 227 10(3)UL (ref 150–450)
POTASSIUM SERPL-SCNC: 3.7 MMOL/L (ref 3.5–5.1)
PROT UR-MCNC: 100 MG/DL
RBC # BLD AUTO: 3.97 X10(6)UL
SODIUM SERPL-SCNC: 132 MMOL/L (ref 136–145)
SP GR UR STRIP: 1.01 (ref 1–1.03)
UROBILINOGEN UR STRIP-ACNC: <2
WBC # BLD AUTO: 14.2 X10(3) UL (ref 4–11)

## 2021-07-12 PROCEDURE — 96376 TX/PRO/DX INJ SAME DRUG ADON: CPT

## 2021-07-12 PROCEDURE — 81001 URINALYSIS AUTO W/SCOPE: CPT | Performed by: EMERGENCY MEDICINE

## 2021-07-12 PROCEDURE — 96361 HYDRATE IV INFUSION ADD-ON: CPT

## 2021-07-12 PROCEDURE — 82962 GLUCOSE BLOOD TEST: CPT

## 2021-07-12 PROCEDURE — 85025 COMPLETE CBC W/AUTO DIFF WBC: CPT | Performed by: EMERGENCY MEDICINE

## 2021-07-12 PROCEDURE — 80048 BASIC METABOLIC PNL TOTAL CA: CPT | Performed by: EMERGENCY MEDICINE

## 2021-07-12 PROCEDURE — 96374 THER/PROPH/DIAG INJ IV PUSH: CPT

## 2021-07-12 PROCEDURE — 99285 EMERGENCY DEPT VISIT HI MDM: CPT

## 2021-07-12 RX ORDER — INSULIN ASPART 100 [IU]/ML
6 INJECTION, SOLUTION INTRAVENOUS; SUBCUTANEOUS ONCE
Status: COMPLETED | OUTPATIENT
Start: 2021-07-12 | End: 2021-07-12

## 2021-07-14 NOTE — ED PROVIDER NOTES
Patient Seen in: [facility] Emergency Department    History   Patient presents with:  Hyperglycemia      HPI    The patient presents to the ED complaining of cold symptoms for the past several days including congestion, fatigue and decreased appet today, pertinent positives to the presenting problem noted.     Physical Exam     ED Triage Vitals [07/12/21 1817]   /66   Pulse 92   Resp 16   Temp 98.9 °F (37.2 °C)   Temp src Oral   SpO2 97 %   O2 Device None (Room air)       All measures to preven Result Value    Glucose 334 (*)     Sodium 132 (*)     Chloride 97 (*)     BUN 20 (*)     Creatinine 1.18 (*)     GFR, Non- 46 (*)     GFR, -American 53 (*)     All other components within normal limits   URINALYSIS WITH CULTURE REFL Medications   sodium chloride 0.9% IV bolus 1,000 mL (0 mL Intravenous Stopped 7/12/21 2248)   Insulin Regular Human (NOVOLIN R) 100 UNIT/ML injection 5 Units (5 Units Intravenous Given 7/12/21 1919)   sodium chloride 0.9% IV bolus 1,000 mL (0 mL [facility] worse.    Condition upon leaving the department: Stable    Disposition and Plan     Clinical Impression:  Hyperglycemia  (primary encounter diagnosis)    Disposition:  Discharge    Follow-up:  Dwayne Ramirez MD  [address]  [address]

## 2021-07-24 ENCOUNTER — LAB ENCOUNTER (OUTPATIENT)
Dept: LAB | Facility: HOSPITAL | Age: 71
End: 2021-07-24
Attending: INTERNAL MEDICINE
Payer: MEDICARE

## 2021-07-24 DIAGNOSIS — E55.9 VITAMIN D DEFICIENCY: ICD-10-CM

## 2021-07-24 DIAGNOSIS — E10.9 DIABETES MELLITUS TYPE I (HCC): Primary | ICD-10-CM

## 2021-07-24 DIAGNOSIS — E03.9 HYPOTHYROIDISM: ICD-10-CM

## 2021-07-24 LAB
ALBUMIN SERPL-MCNC: 3.4 G/DL (ref 3.4–5)
ALBUMIN/GLOB SERPL: 1.1 {RATIO} (ref 1–2)
ALP LIVER SERPL-CCNC: 41 U/L
ALT SERPL-CCNC: 34 U/L
ANION GAP SERPL CALC-SCNC: 6 MMOL/L (ref 0–18)
AST SERPL-CCNC: 27 U/L (ref 15–37)
BILIRUB SERPL-MCNC: 0.3 MG/DL (ref 0.1–2)
BUN BLD-MCNC: 20 MG/DL (ref 7–18)
BUN/CREAT SERPL: 21.3 (ref 10–20)
CALCIUM BLD-MCNC: 10.1 MG/DL (ref 8.5–10.1)
CHLORIDE SERPL-SCNC: 101 MMOL/L (ref 98–112)
CHOLEST SMN-MCNC: 148 MG/DL (ref ?–200)
CO2 SERPL-SCNC: 28 MMOL/L (ref 21–32)
CREAT BLD-MCNC: 0.94 MG/DL
CREAT UR-SCNC: 66.9 MG/DL
EST. AVERAGE GLUCOSE BLD GHB EST-MCNC: 177 MG/DL (ref 68–126)
GLOBULIN PLAS-MCNC: 3 G/DL (ref 2.8–4.4)
GLUCOSE BLD-MCNC: 70 MG/DL (ref 70–99)
HBA1C MFR BLD HPLC: 7.8 % (ref ?–5.7)
HDLC SERPL-MCNC: 59 MG/DL (ref 40–59)
LDLC SERPL CALC-MCNC: 80 MG/DL (ref ?–100)
M PROTEIN MFR SERPL ELPH: 6.4 G/DL (ref 6.4–8.2)
MICROALBUMIN UR-MCNC: 10.3 MG/DL
MICROALBUMIN/CREAT 24H UR-RTO: 154 UG/MG (ref ?–30)
NONHDLC SERPL-MCNC: 89 MG/DL (ref ?–130)
OSMOLALITY SERPL CALC.SUM OF ELEC: 281 MOSM/KG (ref 275–295)
PATIENT FASTING Y/N/NP: YES
PATIENT FASTING Y/N/NP: YES
POTASSIUM SERPL-SCNC: 4.2 MMOL/L (ref 3.5–5.1)
SODIUM SERPL-SCNC: 135 MMOL/L (ref 136–145)
T4 FREE SERPL-MCNC: 1.2 NG/DL (ref 0.8–1.7)
TRIGL SERPL-MCNC: 38 MG/DL (ref 30–149)
TSI SER-ACNC: 1.06 MIU/ML (ref 0.36–3.74)
VLDLC SERPL CALC-MCNC: 6 MG/DL (ref 0–30)

## 2021-07-24 PROCEDURE — 84439 ASSAY OF FREE THYROXINE: CPT

## 2021-07-24 PROCEDURE — 82306 VITAMIN D 25 HYDROXY: CPT

## 2021-07-24 PROCEDURE — 82570 ASSAY OF URINE CREATININE: CPT

## 2021-07-24 PROCEDURE — 84443 ASSAY THYROID STIM HORMONE: CPT

## 2021-07-24 PROCEDURE — 80061 LIPID PANEL: CPT

## 2021-07-24 PROCEDURE — 82043 UR ALBUMIN QUANTITATIVE: CPT

## 2021-07-24 PROCEDURE — 36415 COLL VENOUS BLD VENIPUNCTURE: CPT

## 2021-07-24 PROCEDURE — 80053 COMPREHEN METABOLIC PANEL: CPT

## 2021-07-24 PROCEDURE — 83036 HEMOGLOBIN GLYCOSYLATED A1C: CPT

## 2021-07-26 LAB — 25(OH)D3 SERPL-MCNC: 121 NG/ML (ref 30–100)

## 2022-01-24 ENCOUNTER — HOSPITAL ENCOUNTER (OUTPATIENT)
Dept: MAMMOGRAPHY | Facility: HOSPITAL | Age: 72
Discharge: HOME | End: 2022-01-24
Attending: INTERNAL MEDICINE
Payer: MEDICARE

## 2022-01-24 DIAGNOSIS — Z12.31 ENCOUNTER FOR SCREENING MAMMOGRAM FOR MALIGNANT NEOPLASM OF BREAST: ICD-10-CM

## 2022-01-24 PROCEDURE — 77063 BREAST TOMOSYNTHESIS BI: CPT | Performed by: INTERNAL MEDICINE

## 2022-01-24 PROCEDURE — 77067 SCR MAMMO BI INCL CAD: CPT | Performed by: INTERNAL MEDICINE

## 2022-02-07 ENCOUNTER — LAB ENCOUNTER (OUTPATIENT)
Dept: LAB | Facility: HOSPITAL | Age: 72
End: 2022-02-07
Attending: INTERNAL MEDICINE
Payer: MEDICARE

## 2022-02-07 DIAGNOSIS — E78.2 MIXED HYPERLIPIDEMIA: ICD-10-CM

## 2022-02-07 DIAGNOSIS — E10.9 TYPE 1 DIABETES MELLITUS (HCC): Primary | ICD-10-CM

## 2022-02-07 LAB
ALBUMIN SERPL-MCNC: 3.7 G/DL (ref 3.4–5)
ALBUMIN/GLOB SERPL: 1.3 {RATIO} (ref 1–2)
ALP LIVER SERPL-CCNC: 47 U/L
ALT SERPL-CCNC: 31 U/L
ANION GAP SERPL CALC-SCNC: 4 MMOL/L (ref 0–18)
AST SERPL-CCNC: 25 U/L (ref 15–37)
BILIRUB SERPL-MCNC: 0.4 MG/DL (ref 0.1–2)
BUN BLD-MCNC: 26 MG/DL (ref 7–18)
BUN/CREAT SERPL: 29.2 (ref 10–20)
CALCIUM BLD-MCNC: 10.2 MG/DL (ref 8.5–10.1)
CHLORIDE SERPL-SCNC: 104 MMOL/L (ref 98–112)
CHOLEST SERPL-MCNC: 167 MG/DL (ref ?–200)
CO2 SERPL-SCNC: 28 MMOL/L (ref 21–32)
CREAT BLD-MCNC: 0.89 MG/DL
CREAT UR-SCNC: 98.5 MG/DL
EST. AVERAGE GLUCOSE BLD GHB EST-MCNC: 174 MG/DL (ref 68–126)
FASTING PATIENT LIPID ANSWER: YES
FASTING STATUS PATIENT QL REPORTED: YES
GLOBULIN PLAS-MCNC: 2.9 G/DL (ref 2.8–4.4)
GLUCOSE BLD-MCNC: 184 MG/DL (ref 70–99)
HBA1C MFR BLD: 7.7 % (ref ?–5.7)
HDLC SERPL-MCNC: 71 MG/DL (ref 40–59)
LDLC SERPL CALC-MCNC: 86 MG/DL (ref ?–100)
MICROALBUMIN UR-MCNC: 26.5 MG/DL
MICROALBUMIN/CREAT 24H UR-RTO: 269 UG/MG (ref ?–30)
NONHDLC SERPL-MCNC: 96 MG/DL (ref ?–130)
OSMOLALITY SERPL CALC.SUM OF ELEC: 292 MOSM/KG (ref 275–295)
POTASSIUM SERPL-SCNC: 4.9 MMOL/L (ref 3.5–5.1)
PROT SERPL-MCNC: 6.6 G/DL (ref 6.4–8.2)
SODIUM SERPL-SCNC: 136 MMOL/L (ref 136–145)
TRIGL SERPL-MCNC: 50 MG/DL (ref 30–149)

## 2022-02-07 PROCEDURE — 80053 COMPREHEN METABOLIC PANEL: CPT

## 2022-02-07 PROCEDURE — 82570 ASSAY OF URINE CREATININE: CPT

## 2022-02-07 PROCEDURE — 83036 HEMOGLOBIN GLYCOSYLATED A1C: CPT

## 2022-02-07 PROCEDURE — 80061 LIPID PANEL: CPT

## 2022-02-07 PROCEDURE — 82043 UR ALBUMIN QUANTITATIVE: CPT

## 2022-02-07 PROCEDURE — 36415 COLL VENOUS BLD VENIPUNCTURE: CPT

## 2022-09-20 ENCOUNTER — LAB ENCOUNTER (OUTPATIENT)
Dept: LAB | Facility: HOSPITAL | Age: 72
End: 2022-09-20
Attending: INTERNAL MEDICINE

## 2022-09-20 DIAGNOSIS — E03.9 HYPOTHYROIDISM: ICD-10-CM

## 2022-09-20 DIAGNOSIS — E78.2 MIXED HYPERLIPIDEMIA: ICD-10-CM

## 2022-09-20 DIAGNOSIS — E10.9 DIABETES MELLITUS TYPE I (HCC): Primary | ICD-10-CM

## 2022-09-20 LAB
ALBUMIN SERPL-MCNC: 3.7 G/DL (ref 3.4–5)
ALBUMIN/GLOB SERPL: 1.4 {RATIO} (ref 1–2)
ALP LIVER SERPL-CCNC: 39 U/L
ALT SERPL-CCNC: 35 U/L
ANION GAP SERPL CALC-SCNC: 4 MMOL/L (ref 0–18)
AST SERPL-CCNC: 31 U/L (ref 15–37)
BILIRUB SERPL-MCNC: 0.3 MG/DL (ref 0.1–2)
BUN BLD-MCNC: 30 MG/DL (ref 7–18)
BUN/CREAT SERPL: 31.9 (ref 10–20)
CALCIUM BLD-MCNC: 10 MG/DL (ref 8.5–10.1)
CHLORIDE SERPL-SCNC: 104 MMOL/L (ref 98–112)
CHOLEST SERPL-MCNC: 179 MG/DL (ref ?–200)
CO2 SERPL-SCNC: 30 MMOL/L (ref 21–32)
CREAT BLD-MCNC: 0.94 MG/DL
EST. AVERAGE GLUCOSE BLD GHB EST-MCNC: 192 MG/DL (ref 68–126)
FASTING PATIENT LIPID ANSWER: YES
FASTING STATUS PATIENT QL REPORTED: YES
GFR SERPLBLD BASED ON 1.73 SQ M-ARVRAT: 64 ML/MIN/1.73M2 (ref 60–?)
GLOBULIN PLAS-MCNC: 2.7 G/DL (ref 2.8–4.4)
GLUCOSE BLD-MCNC: 130 MG/DL (ref 70–99)
HBA1C MFR BLD: 8.3 % (ref ?–5.7)
HDLC SERPL-MCNC: 69 MG/DL (ref 40–59)
LDLC SERPL CALC-MCNC: 101 MG/DL (ref ?–100)
NONHDLC SERPL-MCNC: 110 MG/DL (ref ?–130)
OSMOLALITY SERPL CALC.SUM OF ELEC: 294 MOSM/KG (ref 275–295)
POTASSIUM SERPL-SCNC: 4.4 MMOL/L (ref 3.5–5.1)
PROT SERPL-MCNC: 6.4 G/DL (ref 6.4–8.2)
SODIUM SERPL-SCNC: 138 MMOL/L (ref 136–145)
T4 FREE SERPL-MCNC: 1.2 NG/DL (ref 0.8–1.7)
TRIGL SERPL-MCNC: 45 MG/DL (ref 30–149)
TSI SER-ACNC: 1.71 MIU/ML (ref 0.36–3.74)
VLDLC SERPL CALC-MCNC: 7 MG/DL (ref 0–30)

## 2022-09-20 PROCEDURE — 84439 ASSAY OF FREE THYROXINE: CPT

## 2022-09-20 PROCEDURE — 84443 ASSAY THYROID STIM HORMONE: CPT

## 2022-09-20 PROCEDURE — 36415 COLL VENOUS BLD VENIPUNCTURE: CPT

## 2022-09-20 PROCEDURE — 80053 COMPREHEN METABOLIC PANEL: CPT

## 2022-09-20 PROCEDURE — 83036 HEMOGLOBIN GLYCOSYLATED A1C: CPT

## 2022-09-20 PROCEDURE — 80061 LIPID PANEL: CPT

## 2023-01-27 ENCOUNTER — LAB ENCOUNTER (OUTPATIENT)
Dept: LAB | Facility: HOSPITAL | Age: 73
End: 2023-01-27
Attending: INTERNAL MEDICINE
Payer: MEDICARE

## 2023-01-27 DIAGNOSIS — E78.2 HYPERLIPIDEMIA, MIXED: ICD-10-CM

## 2023-01-27 DIAGNOSIS — E10.9 DIABETES MELLITUS TYPE I (HCC): ICD-10-CM

## 2023-01-27 DIAGNOSIS — E55.9 VITAMIN D DEFICIENCY: ICD-10-CM

## 2023-01-27 DIAGNOSIS — E03.9 HYPOTHYROIDISM: Primary | ICD-10-CM

## 2023-01-27 LAB
ALBUMIN SERPL-MCNC: 3.6 G/DL (ref 3.4–5)
ALBUMIN/GLOB SERPL: 1.3 {RATIO} (ref 1–2)
ALP LIVER SERPL-CCNC: 39 U/L
ALT SERPL-CCNC: 37 U/L
ANION GAP SERPL CALC-SCNC: 4 MMOL/L (ref 0–18)
AST SERPL-CCNC: 27 U/L (ref 15–37)
BILIRUB SERPL-MCNC: 0.3 MG/DL (ref 0.1–2)
BUN BLD-MCNC: 30 MG/DL (ref 7–18)
BUN/CREAT SERPL: 27.5 (ref 10–20)
CALCIUM BLD-MCNC: 9.8 MG/DL (ref 8.5–10.1)
CHLORIDE SERPL-SCNC: 103 MMOL/L (ref 98–112)
CHOLEST SERPL-MCNC: 132 MG/DL (ref ?–200)
CO2 SERPL-SCNC: 31 MMOL/L (ref 21–32)
CREAT BLD-MCNC: 1.09 MG/DL
CREAT UR-SCNC: 44.6 MG/DL
EST. AVERAGE GLUCOSE BLD GHB EST-MCNC: 166 MG/DL (ref 68–126)
FASTING PATIENT LIPID ANSWER: YES
FASTING STATUS PATIENT QL REPORTED: YES
GFR SERPLBLD BASED ON 1.73 SQ M-ARVRAT: 53 ML/MIN/1.73M2 (ref 60–?)
GLOBULIN PLAS-MCNC: 2.8 G/DL (ref 2.8–4.4)
GLUCOSE BLD-MCNC: 176 MG/DL (ref 70–99)
HBA1C MFR BLD: 7.4 % (ref ?–5.7)
HDLC SERPL-MCNC: 71 MG/DL (ref 40–59)
LDLC SERPL CALC-MCNC: 51 MG/DL (ref ?–100)
MICROALBUMIN UR-MCNC: 13.1 MG/DL
MICROALBUMIN/CREAT 24H UR-RTO: 293.7 UG/MG (ref ?–30)
NONHDLC SERPL-MCNC: 61 MG/DL (ref ?–130)
OSMOLALITY SERPL CALC.SUM OF ELEC: 296 MOSM/KG (ref 275–295)
POTASSIUM SERPL-SCNC: 3.9 MMOL/L (ref 3.5–5.1)
PROT SERPL-MCNC: 6.4 G/DL (ref 6.4–8.2)
SODIUM SERPL-SCNC: 138 MMOL/L (ref 136–145)
T4 FREE SERPL-MCNC: 1.2 NG/DL (ref 0.8–1.7)
TRIGL SERPL-MCNC: 38 MG/DL (ref 30–149)
TSI SER-ACNC: 1.88 MIU/ML (ref 0.36–3.74)
VIT D+METAB SERPL-MCNC: 58 NG/ML (ref 30–100)
VLDLC SERPL CALC-MCNC: 5 MG/DL (ref 0–30)

## 2023-01-27 PROCEDURE — 84439 ASSAY OF FREE THYROXINE: CPT

## 2023-01-27 PROCEDURE — 36415 COLL VENOUS BLD VENIPUNCTURE: CPT

## 2023-01-27 PROCEDURE — 82570 ASSAY OF URINE CREATININE: CPT

## 2023-01-27 PROCEDURE — 80061 LIPID PANEL: CPT

## 2023-01-27 PROCEDURE — 82306 VITAMIN D 25 HYDROXY: CPT

## 2023-01-27 PROCEDURE — 83036 HEMOGLOBIN GLYCOSYLATED A1C: CPT

## 2023-01-27 PROCEDURE — 84443 ASSAY THYROID STIM HORMONE: CPT

## 2023-01-27 PROCEDURE — 80053 COMPREHEN METABOLIC PANEL: CPT

## 2023-01-27 PROCEDURE — 82043 UR ALBUMIN QUANTITATIVE: CPT

## 2023-04-19 ENCOUNTER — HOSPITAL ENCOUNTER (OUTPATIENT)
Dept: MAMMOGRAPHY | Facility: HOSPITAL | Age: 73
Discharge: HOME | End: 2023-04-19
Attending: INTERNAL MEDICINE
Payer: MEDICARE

## 2023-04-19 DIAGNOSIS — Z12.31 ENCOUNTER FOR SCREENING MAMMOGRAM FOR MALIGNANT NEOPLASM OF BREAST: ICD-10-CM

## 2023-04-19 PROCEDURE — 77067 SCR MAMMO BI INCL CAD: CPT | Performed by: INTERNAL MEDICINE

## 2023-04-19 PROCEDURE — 77063 BREAST TOMOSYNTHESIS BI: CPT | Performed by: INTERNAL MEDICINE

## 2023-09-02 ENCOUNTER — LAB ENCOUNTER (OUTPATIENT)
Dept: LAB | Facility: HOSPITAL | Age: 73
End: 2023-09-02
Attending: INTERNAL MEDICINE
Payer: MEDICARE

## 2023-09-02 DIAGNOSIS — E10.9 DIABETES MELLITUS TYPE I (HCC): Primary | ICD-10-CM

## 2023-09-02 DIAGNOSIS — I51.9 MYXEDEMA HEART DISEASE: ICD-10-CM

## 2023-09-02 DIAGNOSIS — E78.2 MIXED HYPERLIPIDEMIA: ICD-10-CM

## 2023-09-02 DIAGNOSIS — E03.9 MYXEDEMA HEART DISEASE: ICD-10-CM

## 2023-09-02 LAB
ALBUMIN SERPL-MCNC: 3.7 G/DL (ref 3.4–5)
ALBUMIN/GLOB SERPL: 1.3 {RATIO} (ref 1–2)
ALP LIVER SERPL-CCNC: 45 U/L
ALT SERPL-CCNC: 40 U/L
ANION GAP SERPL CALC-SCNC: 5 MMOL/L (ref 0–18)
AST SERPL-CCNC: 30 U/L (ref 15–37)
BILIRUB SERPL-MCNC: 0.4 MG/DL (ref 0.1–2)
BUN BLD-MCNC: 25 MG/DL (ref 7–18)
BUN/CREAT SERPL: 28.7 (ref 10–20)
CALCIUM BLD-MCNC: 9.8 MG/DL (ref 8.5–10.1)
CHLORIDE SERPL-SCNC: 104 MMOL/L (ref 98–112)
CHOLEST SERPL-MCNC: 144 MG/DL (ref ?–200)
CO2 SERPL-SCNC: 30 MMOL/L (ref 21–32)
CREAT BLD-MCNC: 0.87 MG/DL
EGFRCR SERPLBLD CKD-EPI 2021: 69 ML/MIN/1.73M2 (ref 60–?)
EST. AVERAGE GLUCOSE BLD GHB EST-MCNC: 171 MG/DL (ref 68–126)
FASTING PATIENT LIPID ANSWER: YES
FASTING STATUS PATIENT QL REPORTED: YES
GLOBULIN PLAS-MCNC: 2.9 G/DL (ref 2.8–4.4)
GLUCOSE BLD-MCNC: 135 MG/DL (ref 70–99)
HBA1C MFR BLD: 7.6 % (ref ?–5.7)
HDLC SERPL-MCNC: 72 MG/DL (ref 40–59)
LDLC SERPL CALC-MCNC: 61 MG/DL (ref ?–100)
NONHDLC SERPL-MCNC: 72 MG/DL (ref ?–130)
OSMOLALITY SERPL CALC.SUM OF ELEC: 294 MOSM/KG (ref 275–295)
POTASSIUM SERPL-SCNC: 4.1 MMOL/L (ref 3.5–5.1)
PROT SERPL-MCNC: 6.6 G/DL (ref 6.4–8.2)
SODIUM SERPL-SCNC: 139 MMOL/L (ref 136–145)
T4 FREE SERPL-MCNC: 1.3 NG/DL (ref 0.8–1.7)
TRIGL SERPL-MCNC: 49 MG/DL (ref 30–149)
TSI SER-ACNC: 1.39 MIU/ML (ref 0.36–3.74)
VLDLC SERPL CALC-MCNC: 7 MG/DL (ref 0–30)

## 2023-09-02 PROCEDURE — 83036 HEMOGLOBIN GLYCOSYLATED A1C: CPT

## 2023-09-02 PROCEDURE — 80061 LIPID PANEL: CPT

## 2023-09-02 PROCEDURE — 36415 COLL VENOUS BLD VENIPUNCTURE: CPT

## 2023-09-02 PROCEDURE — 80053 COMPREHEN METABOLIC PANEL: CPT

## 2023-09-02 PROCEDURE — 84439 ASSAY OF FREE THYROXINE: CPT

## 2023-09-02 PROCEDURE — 84443 ASSAY THYROID STIM HORMONE: CPT

## 2023-09-20 ENCOUNTER — HOSPITAL ENCOUNTER (OUTPATIENT)
Dept: ULTRASOUND IMAGING | Facility: HOSPITAL | Age: 73
Discharge: HOME | End: 2023-09-20
Attending: INTERNAL MEDICINE
Payer: MEDICARE

## 2023-09-20 DIAGNOSIS — R22.43 LOCALIZED SWELLING OF BOTH LOWER LEGS: ICD-10-CM

## 2023-09-20 PROCEDURE — 93970 EXTREMITY STUDY: CPT | Performed by: INTERNAL MEDICINE

## 2024-03-02 ENCOUNTER — LAB ENCOUNTER (OUTPATIENT)
Dept: LAB | Facility: HOSPITAL | Age: 74
End: 2024-03-02
Attending: INTERNAL MEDICINE
Payer: MEDICARE

## 2024-03-02 DIAGNOSIS — E78.2 MIXED HYPERLIPIDEMIA: ICD-10-CM

## 2024-03-02 DIAGNOSIS — E10.9 DIABETES MELLITUS TYPE I (HCC): Primary | ICD-10-CM

## 2024-03-02 LAB
ALBUMIN SERPL-MCNC: 4 G/DL (ref 3.2–4.8)
ALBUMIN/GLOB SERPL: 1.6 {RATIO} (ref 1–2)
ALP LIVER SERPL-CCNC: 50 U/L
ALT SERPL-CCNC: 28 U/L
ANION GAP SERPL CALC-SCNC: 3 MMOL/L (ref 0–18)
AST SERPL-CCNC: 33 U/L (ref ?–34)
BILIRUB SERPL-MCNC: 0.5 MG/DL (ref 0.2–1.1)
BUN BLD-MCNC: 23 MG/DL (ref 9–23)
BUN/CREAT SERPL: 24.7 (ref 10–20)
CALCIUM BLD-MCNC: 10.1 MG/DL (ref 8.7–10.4)
CHLORIDE SERPL-SCNC: 104 MMOL/L (ref 98–112)
CHOLEST SERPL-MCNC: 144 MG/DL (ref ?–200)
CO2 SERPL-SCNC: 27 MMOL/L (ref 21–32)
CREAT BLD-MCNC: 0.93 MG/DL
CREAT UR-SCNC: 25.7 MG/DL
EGFRCR SERPLBLD CKD-EPI 2021: 64 ML/MIN/1.73M2 (ref 60–?)
EST. AVERAGE GLUCOSE BLD GHB EST-MCNC: 180 MG/DL (ref 68–126)
FASTING PATIENT LIPID ANSWER: YES
FASTING STATUS PATIENT QL REPORTED: YES
GLOBULIN PLAS-MCNC: 2.5 G/DL (ref 2.8–4.4)
GLUCOSE BLD-MCNC: 131 MG/DL (ref 70–99)
HBA1C MFR BLD: 7.9 % (ref ?–5.7)
HDLC SERPL-MCNC: 56 MG/DL (ref 40–59)
LDLC SERPL CALC-MCNC: 79 MG/DL (ref ?–100)
MICROALBUMIN UR-MCNC: 6.3 MG/DL
MICROALBUMIN/CREAT 24H UR-RTO: 245.1 UG/MG (ref ?–30)
NONHDLC SERPL-MCNC: 88 MG/DL (ref ?–130)
OSMOLALITY SERPL CALC.SUM OF ELEC: 283 MOSM/KG (ref 275–295)
POTASSIUM SERPL-SCNC: 4.4 MMOL/L (ref 3.5–5.1)
PROT SERPL-MCNC: 6.5 G/DL (ref 5.7–8.2)
SODIUM SERPL-SCNC: 134 MMOL/L (ref 136–145)
TRIGL SERPL-MCNC: 40 MG/DL (ref 30–149)
VLDLC SERPL CALC-MCNC: 6 MG/DL (ref 0–30)

## 2024-03-02 PROCEDURE — 82570 ASSAY OF URINE CREATININE: CPT

## 2024-03-02 PROCEDURE — 80053 COMPREHEN METABOLIC PANEL: CPT

## 2024-03-02 PROCEDURE — 80061 LIPID PANEL: CPT

## 2024-03-02 PROCEDURE — 83036 HEMOGLOBIN GLYCOSYLATED A1C: CPT

## 2024-03-02 PROCEDURE — 82043 UR ALBUMIN QUANTITATIVE: CPT

## 2024-03-02 PROCEDURE — 36415 COLL VENOUS BLD VENIPUNCTURE: CPT

## 2024-04-22 ENCOUNTER — ORDER TRANSCRIPTION (OUTPATIENT)
Dept: ADMINISTRATIVE | Facility: HOSPITAL | Age: 74
End: 2024-04-22

## 2024-04-22 DIAGNOSIS — Z13.9 ENCOUNTER FOR SCREENING: Primary | ICD-10-CM

## 2024-04-28 ENCOUNTER — HOSPITAL ENCOUNTER (OUTPATIENT)
Dept: CT IMAGING | Age: 74
Discharge: HOME | End: 2024-04-28
Attending: INTERNAL MEDICINE

## 2024-04-28 DIAGNOSIS — Z13.9 ENCOUNTER FOR SCREENING: ICD-10-CM

## 2024-05-23 ENCOUNTER — HOSPITAL ENCOUNTER (OUTPATIENT)
Dept: CT IMAGING | Facility: HOSPITAL | Age: 74
Discharge: HOME | End: 2024-05-23
Attending: INTERNAL MEDICINE

## 2024-05-23 DIAGNOSIS — R91.1 LUNG NODULE: ICD-10-CM

## 2024-05-23 PROCEDURE — 71250 CT THORAX DX C-: CPT | Performed by: INTERNAL MEDICINE

## 2024-06-15 ENCOUNTER — HOSPITAL ENCOUNTER (OUTPATIENT)
Dept: CT IMAGING | Facility: HOSPITAL | Age: 74
Discharge: HOME | End: 2024-06-15
Attending: UROLOGY

## 2024-06-15 DIAGNOSIS — N28.9 RENAL LESION: ICD-10-CM

## 2024-06-15 LAB
CREAT BLD-MCNC: 1 MG/DL
EGFRCR SERPLBLD CKD-EPI 2021: 58 ML/MIN/1.73M2 (ref 60–?)

## 2024-06-15 PROCEDURE — 82565 ASSAY OF CREATININE: CPT

## 2024-06-15 PROCEDURE — 74178 CT ABD&PLV WO CNTR FLWD CNTR: CPT | Performed by: UROLOGY

## 2024-06-19 ENCOUNTER — HOSPITAL ENCOUNTER (OUTPATIENT)
Dept: MAMMOGRAPHY | Facility: HOSPITAL | Age: 74
Discharge: HOME | End: 2024-06-19
Attending: INTERNAL MEDICINE

## 2024-06-19 DIAGNOSIS — Z12.31 BREAST CANCER SCREENING BY MAMMOGRAM: ICD-10-CM

## 2024-06-19 PROCEDURE — 77067 SCR MAMMO BI INCL CAD: CPT | Performed by: INTERNAL MEDICINE

## 2024-06-19 PROCEDURE — 77063 BREAST TOMOSYNTHESIS BI: CPT | Performed by: INTERNAL MEDICINE

## 2024-07-02 ENCOUNTER — OFFICE VISIT (OUTPATIENT)
Dept: SURGERY | Facility: CLINIC | Age: 74
End: 2024-07-02
Payer: MEDICARE

## 2024-07-02 ENCOUNTER — LAB ENCOUNTER (OUTPATIENT)
Dept: LAB | Facility: HOSPITAL | Age: 74
End: 2024-07-02
Attending: PHYSICIAN ASSISTANT
Payer: MEDICARE

## 2024-07-02 VITALS
DIASTOLIC BLOOD PRESSURE: 61 MMHG | TEMPERATURE: 97 F | WEIGHT: 121.19 LBS | BODY MASS INDEX: 21 KG/M2 | OXYGEN SATURATION: 98 % | HEART RATE: 74 BPM | RESPIRATION RATE: 18 BRPM | SYSTOLIC BLOOD PRESSURE: 163 MMHG

## 2024-07-02 DIAGNOSIS — K86.2 PANCREATIC CYST (HCC): Primary | ICD-10-CM

## 2024-07-02 DIAGNOSIS — K86.2 PANCREATIC CYST (HCC): ICD-10-CM

## 2024-07-02 DIAGNOSIS — D37.9 NEOPLASM OF UNCERTAIN BEHAVIOR OF DIGESTIVE ORGAN, UNSPECIFIED: ICD-10-CM

## 2024-07-02 LAB — CANCER AG19-9 SERPL-ACNC: 37.5 U/ML (ref ?–35)

## 2024-07-02 PROCEDURE — 86316 IMMUNOASSAY TUMOR OTHER: CPT

## 2024-07-02 PROCEDURE — 36415 COLL VENOUS BLD VENIPUNCTURE: CPT

## 2024-07-02 PROCEDURE — 99205 OFFICE O/P NEW HI 60 MIN: CPT | Performed by: SURGERY

## 2024-07-02 PROCEDURE — 86301 IMMUNOASSAY TUMOR CA 19-9: CPT

## 2024-07-05 LAB — CHROMOGRANIN A: 189.4 NG/ML

## 2024-07-09 NOTE — CONSULTS
[facility]    Patient Name:  Zenia Mcduffie   YOB: 1950   Gender:  Female   Appt Date:  7/2/2024   Provider:  Nito Castillo MD   Insurance:  MEDICARE PART B ONLY     PATIENT PROVIDERS  Referring Provider: No ref. provider found   Address: No referring provider defined for this encounter.   Phone #: N/A    Primary Care Provider:Arnel Dickerson MD   Address: [address] [place] [address] [address]  [facility] [address]   Phone #: [phone]       CHIEF COMPLAINT  Chief Complaint   Patient presents with    Consult        PROBLEMS  Reviewed   Patient Active Problem List   Diagnosis    Type 1 diabetes mellitus (HCC)    Essential hypertension    Celiac disease (HCC)    Hypothyroid    Hyperlipidemia        History of Present Illness:  Patient is being evaluated today at the request of Dr. Al to render opinion guarding surgical management of the pancreatic cystic lesion.Patient has been following with Dr. Al and is planned to undergo a partial nephrectomy for a left upper pole kidney 4 cm mass.  Incidentally, on CT scan, she was noted to have a 1.4 cm cystic lesion within the pancreatic uncinate process with an enhancing mural nodule suspicious for pancreatic malignancy.  In addition, there was a 0.9 cm nodule within the right lower lobe suspicious for pulmonary metastasis.     Vital Signs:  BP (!) 163/61   Pulse 74   Temp 97.3 °F (36.3 °C) (Temporal)   Resp 18   Wt 55 kg (121 lb 3.2 oz)   SpO2 98%   BMI 21.47 kg/m²      Medications Reviewed:    Current Outpatient Medications:     MAGNESIUM OXIDE 400 MG Oral Tab, TAKE 1 TABLET BY MOUTH EVERY DAY, Disp: 90 tablet, Rfl: 1    FOLBEE 2.5-25-1 MG Oral Tab, TAKE 1 TABLET BY MOUTH EVERY DAY, Disp: 90 tablet, Rfl: 1    OLMESARTAN MEDOXOMIL 40 MG Oral Tab, TAKE 1 TABLET BY MOUTH EVERY DAY, Disp: 90 tablet, Rfl: 1    LEVOTHYROXINE 75 MCG Oral Tab, TAKE 1 TABLET BY MOUTH EVERY DAY, Disp: 90 tablet, Rfl: 1     HYDROCHLOROTHIAZIDE 25 MG Oral Tab, TAKE 1 TABLET BY MOUTH EVERY DAY, Disp: 90 tablet, Rfl: 1    OMEGA-3-ACID ETHYL ESTERS 1 g Oral Cap, TAKE 4 CAPSULES (4 G TOTAL) BY MOUTH DAILY., Disp: 360 capsule, Rfl: 1    Insulin Degludec (TRESIBA) 100 UNIT/ML Subcutaneous Solution, Inject 11 Units into the skin every evening., Disp: , Rfl: 0    Insulin Pen Needle (BD PEN NEEDLE MINI U/F) 31G X 5 MM Does not apply Misc, USE 4 TIMES DAILY.. Insulin Dependent Diabetes Type II. Z79.4, E11.9., Disp: 400 each, Rfl: 3    Insulin Aspart (NOVOLOG FLEXPEN SC), Inject into the skin. Sliding scale, Disp: , Rfl:     Cholecalciferol (HM VITAMIN D3) 4000 units Oral Cap, Take 1 capsule by mouth every 7 days., Disp: , Rfl:     Vitamin B-12 1000 MCG Oral Tab, Take 1 tablet (1,000 mcg total) by mouth daily., Disp: , Rfl:     Biotin (BIOTIN 5000) 5 MG Oral Cap, Take 5,000 mg by mouth daily., Disp: , Rfl:     Multiple Vitamins-Minerals (MULTI FOR HER) Oral Cap, Take 1 capsule by mouth., Disp: , Rfl:     Calcium Citrate-Vitamin D (CITRACAL + D OR), Take 1 capsule by mouth 2 (two) times daily., Disp: , Rfl:     ACCU-CHEK COMPACT PLUS In Vitro Strip, by Finger stick route As Directed. Use 6-8 times daily, Disp: , Rfl: 1     Allergies Reviewed:  Allergies   Allergen Reactions    Gluten Flour UNKNOWN        History:  Reviewed:  Past Medical History:    Basal cell carcinoma    Celiac disease (HCC)    Essential hypertension    Hyperchloremia    Hyperlipidemia    Hypothyroid    Microscopic colitis    Solitary kidney    Type 1 diabetes mellitus (HCC)    sees Dr. Tinajero, with retinopathy      Reviewed:  Past Surgical History:   Procedure Laterality Date          Colonoscopy  2012    Needle biopsy left Left         Stapedectomy        Reviewed Social History:  Social History     Socioeconomic History    Marital status:    Tobacco Use    Smoking status: Never    Smokeless tobacco: Never   Vaping Use    Vaping status: Never Used    Substance and Sexual Activity    Alcohol use: Not Currently     Comment: very rare    Drug use: Never      Reviewed:  Family History   Problem Relation Age of Onset    Ovarian Cancer Maternal Grandmother         70s    Breast Cancer Neg         Review of Systems:  Review of Systems     Physical Examination:  Physical Exam   PROCEDURE: CT ABDOMEN + PELVIS (ALL W+WO) (CPT=74178)     COMPARISON: [facility], CT CHEST (CPT=71250), 5/23/2024, 9:49 AM.  [facility], CT CALCIUM SCORING OVER READ, 4/28/2024, 12:48 PM.  [facility], CT OUTSIDE CD, 5/29/2012, 6:31 PM.     INDICATIONS: Eval for left renal lesion found on previous CT imaging, no complaints.     TECHNIQUE: CT images of the abdomen and pelvis were obtained without and with non-ionic intravenous contrast material.  Automated exposure control for dose reduction was used. Adjustment of the mA and/or kV was done based on the patient's size. Use of  iterative reconstruction technique for dose reduction was used. Dose information is transmitted to the ACR (American College of Radiology) NRDR (National Radiology Data Registry) which includes the Dose Index Registry.     TECHNIQUE:   CT images of the abdomen and pelvis were obtained without and with non-ionic intravenous contrast material. Automated exposure control for dose reduction was used. Adjustment of the mA and/or kV was done based on the patient's size. Use of  iterative reconstruction technique for dose reduction was used.  Dose information is transmitted to the ACR (American College of Radiology) NRDR (National Radiology Data Registry) which includes the Dose Index Registry. Delayed excretory phase imaging  was also performed. Thin sections and 3-D reconstructions were performed and viewed on an independent workstation.     FINDINGS:     KIDNEYS AND URINARY TRACT:     RIGHT: Absent right kidney.     LEFT: Within the left kidney, there is  a peripherally enhancing mass which measures 4 x 3 x 2.8 cm.  The enhancement is seen best on the delayed phase sequences.  There is central areas of diminished enhancement suspicious for necrosis.  Multiple  hyperdense cysts seen throughout the left kidney measuring up to 2.7 cm.  There is no hydronephrosis.  No radiopaque renal calculus.  Within the proximal ureter, there is a soft tissue density seen best on series 14, image 48, series 16, image 38, and  series 17, image 49 with narrowing of the lumen.       BLADDER: No wall thickening, calculus, or mass. No pericystic inflammation.    ADRENALS:   The adrenal glands are unremarkable.  LIVER: There are a few subcentimeter hypodense lesions seen in the liver which are too small to characterize.  GALLBLADDER: The gallbladder is unremarkable.  BILIARY: There is no intra-or extrahepatic biliary duct dilation.  SPLEEN: The spleen is unremarkable.  PANCREAS: Within the pancreatic uncinate process, there is a 1.6 x 1.4 cm cystic area within enhancing mural nodule.  No pancreatic ductal dilation.  AORTA/VASCULAR:   There is atherosclerotic calcification of the abdominal aorta extending into bilateral iliac arteries.  RETROPERITONEUM: There are scattered subcentimeter nonspecific retroperitoneal lymph nodes.  BOWEL/MESENTERY: There is diverticulosis involving the distal descending colon and sigmoid colon without evidence of diverticulitis. There is no dilation or wall thickening. The appendix is normal. There are no inflammatory changes within the right  lower quadrant.    PELVIS: No enlarged mass or adenopathy.    BONES:   There is degenerative disease of the thoracic and lumbar spine. Degenerative changes are seen within the sacroiliac joints and pubic symphysis. Degenerative changes are seen in the bilateral hips. There is grade 1 anterolisthesis of L4 on L5.  LUNG BASES: 0.9 cm nodule within the medial right lower lobe is new since 2012.  Scarring seen within the right  lower lobe. There are coronary artery calcifications.                  Impression   CONCLUSION:     4 cm centrally necrotic mass within the left upper pole of the kidney suspicious for a renal cell carcinoma.     Filling defect within the proximal left ureter is indeterminate.  A transitional cell carcinoma is the leading differential and should be excluded.     1.4 cm cystic lesion within the pancreatic uncinate process with an enhancing mural nodule suspicious for cystic pancreatic malignancy.     0.9 cm nodule within the right lower lobe suspicious for pulmonary metastases.        Multiple other incidental findings as described in the body of the report.               Dictated by (CST): Tad Nunez MD on 6/17/2024 at 5:11 PM      Finalized by (CST): Tad Nunez MD on 6/17/2024 at 5:20 PM       Assessment / Plan:    ICD-10-CM    1. Pancreatic cyst (HCC)  K86.2 CARBOHYDRATE ANTIGEN 19-9 [E]     CHROMOGRANIN A [E]      2. 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 Nunez  Proceed with EUS and possible biopsy  Tumor markers [CA 19-9 and chromogranin A]  Follow-up thereafter    Nito Castillo MD  Complex General Surgical Oncology  [facility]  [email or web address]           Follow Up:  No follow-ups on file.       Electronically Signed by: Nito Castillo MD

## 2024-07-13 RX ORDER — FINERENONE 10 MG/1
10 TABLET, FILM COATED ORAL DAILY
COMMUNITY

## 2024-07-13 RX ORDER — VIT C/B6/B5/MAGNESIUM/HERB 173 50-5-6-5MG
1000 CAPSULE ORAL DAILY
COMMUNITY

## 2024-07-13 RX ORDER — ATORVASTATIN CALCIUM 40 MG/1
40 TABLET, FILM COATED ORAL NIGHTLY
COMMUNITY

## 2024-07-18 ENCOUNTER — LAB ENCOUNTER (OUTPATIENT)
Dept: LAB | Facility: HOSPITAL | Age: 74
End: 2024-07-18
Attending: INTERNAL MEDICINE
Payer: MEDICARE

## 2024-07-18 DIAGNOSIS — H54.3 UNQUALIFIED VISUAL LOSS OF BOTH EYES DUE TO TYPE 1 DIABETES MELLITUS, WITHOUT MACULAR EDEMA, WITH MILD NONPROLIFERATIVE RETINOPATHY (HCC): ICD-10-CM

## 2024-07-18 DIAGNOSIS — E10.3293 UNQUALIFIED VISUAL LOSS OF BOTH EYES DUE TO TYPE 1 DIABETES MELLITUS, WITHOUT MACULAR EDEMA, WITH MILD NONPROLIFERATIVE RETINOPATHY (HCC): ICD-10-CM

## 2024-07-18 DIAGNOSIS — N28.89 URETERAL FISTULA: Primary | ICD-10-CM

## 2024-07-18 DIAGNOSIS — I10 HYPERTENSION, ESSENTIAL: ICD-10-CM

## 2024-07-18 LAB
ALBUMIN SERPL-MCNC: 4.2 G/DL (ref 3.2–4.8)
ALBUMIN/GLOB SERPL: 2 {RATIO} (ref 1–2)
ALP LIVER SERPL-CCNC: 45 U/L
ALT SERPL-CCNC: 35 U/L
ANION GAP SERPL CALC-SCNC: 5 MMOL/L (ref 0–18)
AST SERPL-CCNC: 45 U/L (ref ?–34)
BASOPHILS # BLD AUTO: 0.02 X10(3) UL (ref 0–0.2)
BASOPHILS NFR BLD AUTO: 0.3 %
BILIRUB SERPL-MCNC: 0.4 MG/DL (ref 0.2–1.1)
BILIRUB UR QL: NEGATIVE
BUN BLD-MCNC: 29 MG/DL (ref 9–23)
BUN/CREAT SERPL: 28.2 (ref 10–20)
CALCIUM BLD-MCNC: 9.6 MG/DL (ref 8.7–10.4)
CHLORIDE SERPL-SCNC: 100 MMOL/L (ref 98–112)
CLARITY UR: CLEAR
CO2 SERPL-SCNC: 25 MMOL/L (ref 21–32)
COLOR UR: COLORLESS
CREAT BLD-MCNC: 1.03 MG/DL
DEPRECATED RDW RBC AUTO: 44 FL (ref 35.1–46.3)
EGFRCR SERPLBLD CKD-EPI 2021: 56 ML/MIN/1.73M2 (ref 60–?)
EOSINOPHIL # BLD AUTO: 0.03 X10(3) UL (ref 0–0.7)
EOSINOPHIL NFR BLD AUTO: 0.4 %
ERYTHROCYTE [DISTWIDTH] IN BLOOD BY AUTOMATED COUNT: 13.2 % (ref 11–15)
FASTING STATUS PATIENT QL REPORTED: NO
GLOBULIN PLAS-MCNC: 2.1 G/DL (ref 2–3.5)
GLUCOSE BLD-MCNC: 106 MG/DL (ref 70–99)
GLUCOSE UR-MCNC: NORMAL MG/DL
HCT VFR BLD AUTO: 31.9 %
HGB BLD-MCNC: 11 G/DL
HGB UR QL STRIP.AUTO: NEGATIVE
IMM GRANULOCYTES # BLD AUTO: 0.03 X10(3) UL (ref 0–1)
IMM GRANULOCYTES NFR BLD: 0.4 %
KETONES UR-MCNC: NEGATIVE MG/DL
LEUKOCYTE ESTERASE UR QL STRIP.AUTO: NEGATIVE
LYMPHOCYTES # BLD AUTO: 1.67 X10(3) UL (ref 1–4)
LYMPHOCYTES NFR BLD AUTO: 22.5 %
MCH RBC QN AUTO: 31.4 PG (ref 26–34)
MCHC RBC AUTO-ENTMCNC: 34.5 G/DL (ref 31–37)
MCV RBC AUTO: 91.1 FL
MONOCYTES # BLD AUTO: 0.64 X10(3) UL (ref 0.1–1)
MONOCYTES NFR BLD AUTO: 8.6 %
NEUTROPHILS # BLD AUTO: 5.04 X10 (3) UL (ref 1.5–7.7)
NEUTROPHILS # BLD AUTO: 5.04 X10(3) UL (ref 1.5–7.7)
NEUTROPHILS NFR BLD AUTO: 67.8 %
NITRITE UR QL STRIP.AUTO: NEGATIVE
OSMOLALITY SERPL CALC.SUM OF ELEC: 276 MOSM/KG (ref 275–295)
PH UR: 6.5 [PH] (ref 5–8)
PLATELET # BLD AUTO: 272 10(3)UL (ref 150–450)
POTASSIUM SERPL-SCNC: 4.7 MMOL/L (ref 3.5–5.1)
PROT SERPL-MCNC: 6.3 G/DL (ref 5.7–8.2)
PROT UR-MCNC: NEGATIVE MG/DL
RBC # BLD AUTO: 3.5 X10(6)UL
SODIUM SERPL-SCNC: 130 MMOL/L (ref 136–145)
SP GR UR STRIP: 1.01 (ref 1–1.03)
UROBILINOGEN UR STRIP-ACNC: NORMAL
WBC # BLD AUTO: 7.4 X10(3) UL (ref 4–11)

## 2024-07-18 PROCEDURE — 87086 URINE CULTURE/COLONY COUNT: CPT

## 2024-07-18 PROCEDURE — 81001 URINALYSIS AUTO W/SCOPE: CPT

## 2024-07-18 PROCEDURE — 36415 COLL VENOUS BLD VENIPUNCTURE: CPT

## 2024-07-18 PROCEDURE — 80053 COMPREHEN METABOLIC PANEL: CPT

## 2024-07-18 PROCEDURE — 85025 COMPLETE CBC W/AUTO DIFF WBC: CPT

## 2024-07-18 PROCEDURE — 81015 MICROSCOPIC EXAM OF URINE: CPT

## 2024-07-23 RX ORDER — CEFAZOLIN SODIUM 1 G/3ML
1 INJECTION, POWDER, FOR SOLUTION INTRAMUSCULAR; INTRAVENOUS
Status: COMPLETED | OUTPATIENT
Start: 2024-07-24 | End: 2024-07-24

## 2024-07-23 NOTE — DISCHARGE INSTRUCTIONS
After the procedure  If you had a sedative, general anesthesia, or spinal anesthesia, you must have someone drive you home. Once you’re home:  Drink plenty of fluids.  You may have burning or light bleeding when you pee. This is normal.  Medicines may be prescribed to ease any mild pain or prevent infection. Take these as directed.  When to call your healthcare provider  Call your healthcare provider if you have any of the following after the procedure:  Heavy bleeding or blood clots  Burning that lasts more than 1 day  Fever of   100° F  ( 38°C ) or higher, or as directed by your provider  Trouble peeing      AMBSURG HOME CARE INSTRUCTIONS: POST-OP ANESTHESIA  The medication that you received for sedation or general anesthesia can last up to 24 hours. Your judgment and reflexes may be altered, even if you feel like your normal self.      We Recommend:   Do not drive any motor vehicle or bicycle   Avoid mowing the lawn, playing sports, or working with power tools/applicances (power saws, electric knives or mixers)   That you have someone stay with you on your first night home   Do not drink alcohol or take sleeping pills or tranquilizers   Do not sign legal documents within 24 hours of your procedure   If you had a nerve block for your surgery, take extra care not to put any pressure on your arm or hand for 24 hours    It is normal:  For you to have a sore throat if you had a breathing tube during surgery (while you were asleep!). The sore throat should get better within 48 hours. You can gargle with warm salt water (1/2 tsp in 4 oz warm water) or use a throat lozenge for comfort  To feel muscle aches or soreness especially in the abdomen, chest or neck. The achy feeling should go away in the next 24 hours  To feel weak, sleepy or \"wiped out\". Your should start feeling better in the next 24 hours.   To experience mild discomforts such as sore lip or tongue, headache, cramps, gas pains or a bloated feeling in your  abdomen.   To experience mild back pain or soreness for a day or two if you had spinal or epidural anesthesia.   If you had laparoscopic surgery, to feel shoulder pain or discomfort on the day of surgery.   For some patients to have nausea after surgery/anesthesia    If you feel nausea or experience vomiting:   Try to move around less.   Eat less than usual or drink only liquids until the next morning   Nausea should resolve in about 24 hours    If you have a problem when you are at home:    Call your surgeons office   Discharge Instructions: After Your Surgery  You’ve just had surgery. During surgery, you were given medicine called anesthesia to keep you relaxed and free of pain. After surgery, you may have some pain or nausea. This is common. Here are some tips for feeling better and getting well after surgery.   Going home  Your healthcare provider will show you how to take care of yourself when you go home. They'll also answer your questions. Have an adult family member or friend drive you home. For the first 24 hours after your surgery:   Don't drive or use heavy equipment.  Don't make important decisions or sign legal papers.  Take medicines as directed.  Don't drink alcohol.  Have someone stay with you, if needed. They can watch for problems and help keep you safe.  Be sure to go to all follow-up visits with your healthcare provider. And rest after your surgery for as long as your provider tells you to.   Coping with pain  If you have pain after surgery, pain medicine will help you feel better. Take it as directed, before pain becomes severe. Also, ask your healthcare provider or pharmacist about other ways to control pain. This might be with heat, ice, or relaxation. And follow any other instructions your surgeon or nurse gives you.      Stay on schedule with your medicine.     Tips for taking pain medicine  To get the best relief possible, remember these points:   Pain medicines can upset your stomach.  Taking them with a little food may help.  Most pain relievers taken by mouth need at least 20 to 30 minutes to start to work.  Don't wait till your pain becomes severe before you take your medicine. Try to time your medicine so that you can take it before starting an activity. This might be before you get dressed, go for a walk, or sit down for dinner.  Constipation is a common side effect of some pain medicines. Call your healthcare provider before taking any medicines such as laxatives or stool softeners to help ease constipation. Also ask if you should skip any foods. Drinking lots of fluids and eating foods such as fruits and vegetables that are high in fiber can also help. Remember, don't take laxatives unless your surgeon has prescribed them.  Drinking alcohol and taking pain medicine can cause dizziness and slow your breathing. It can even be deadly. Don't drink alcohol while taking pain medicine.  Pain medicine can make you react more slowly to things. Don't drive or run machinery while taking pain medicine.  Your healthcare provider may tell you to take acetaminophen to help ease your pain. Ask them how much you're supposed to take each day. Acetaminophen or other pain relievers may interact with your prescription medicines or other over-the-counter (OTC) medicines. Some prescription medicines have acetaminophen and other ingredients in them. Using both prescription and OTC acetaminophen for pain can cause you to accidentally overdose. Read the labels on your OTC medicines with care. This will help you to clearly know the list of ingredients, how much to take, and any warnings. It may also help you not take too much acetaminophen. If you have questions or don't understand the information, ask your pharmacist or healthcare provider to explain it to you before you take the OTC medicine.   Managing nausea  Some people have an upset stomach (nausea) after surgery. This is often because of anesthesia, pain, or  pain medicine, less movement of food in the stomach, or the stress of surgery. These tips will help you handle nausea and eat healthy foods as you get better. If you were on a special food plan before surgery, ask your healthcare provider if you should follow it while you get better. Check with your provider on how your eating should progress. It may depend on the surgery you had. These general tips may help:   Don't push yourself to eat. Your body will tell you when to eat and how much.  Start off with clear liquids and soup. They're easier to digest.  Next try semi-solid foods as you feel ready. These include mashed potatoes, applesauce, and gelatin.  Slowly move to solid foods. Don’t eat fatty, rich, or spicy foods at first.  Don't force yourself to have 3 large meals a day. Instead eat smaller amounts more often.  Take pain medicines with a small amount of solid food, such as crackers or toast. This helps prevent nausea.  When to call your healthcare provider  Call your healthcare provider right away if you have any of these:   You still have too much pain, or the pain gets worse, after taking the medicine. The medicine may not be strong enough. Or there may be a complication from the surgery.  You feel too sleepy, dizzy, or groggy. The medicine may be too strong.  Side effects such as nausea or vomiting. Your healthcare provider may advise taking other medicines to .  Skin changes such as rash, itching, or hives. This may mean you have an allergic reaction. Your provider may advise taking other medicines.  The incision looks different (for instance, part of it opens up).  Bleeding or fluid leaking from the incision site, and weren't told to expect that.  Fever of 100.4°F (38°C) or higher, or as directed by your provider.  Call 911  Call 911 right away if you have:   Trouble breathing  Facial swelling    If you have obstructive sleep apnea   You were given anesthesia medicine during surgery to keep you  comfortable and free of pain. After surgery, you may have more apnea spells because of this medicine and other medicines you were given. The spells may last longer than normal.    At home:  Keep using the continuous positive airway pressure (CPAP) device when you sleep. Unless your healthcare provider tells you not to, use it when you sleep, day or night. CPAP is a common device used to treat obstructive sleep apnea.  Talk with your provider before taking any pain medicine, muscle relaxants, or sedatives. Your provider will tell you about the possible dangers of taking these medicines.  Contact your provider if your sleeping changes a lot even when taking medicines as directed.  [employer] last reviewed this educational content on 10/1/2021  © 1212-9489 [employer]. All rights reserved. This information is not intended as a substitute for professional medical care. Always follow your healthcare professional's instructions.

## 2024-07-24 ENCOUNTER — ANESTHESIA EVENT (OUTPATIENT)
Dept: SURGERY | Facility: HOSPITAL | Age: 74
End: 2024-07-24
Payer: MEDICARE

## 2024-07-24 ENCOUNTER — HOSPITAL ENCOUNTER (OUTPATIENT)
Facility: HOSPITAL | Age: 74
Setting detail: HOSPITAL OUTPATIENT SURGERY
Discharge: HOME | End: 2024-07-24
Attending: UROLOGY | Admitting: UROLOGY
Payer: MEDICARE

## 2024-07-24 ENCOUNTER — APPOINTMENT (OUTPATIENT)
Dept: GENERAL RADIOLOGY | Facility: HOSPITAL | Age: 74
End: 2024-07-24
Attending: UROLOGY
Payer: MEDICARE

## 2024-07-24 ENCOUNTER — ANESTHESIA (OUTPATIENT)
Dept: SURGERY | Facility: HOSPITAL | Age: 74
End: 2024-07-24
Payer: MEDICARE

## 2024-07-24 VITALS
RESPIRATION RATE: 16 BRPM | WEIGHT: 113 LBS | OXYGEN SATURATION: 100 % | DIASTOLIC BLOOD PRESSURE: 61 MMHG | HEIGHT: 63 IN | TEMPERATURE: 98 F | HEART RATE: 66 BPM | BODY MASS INDEX: 20.02 KG/M2 | SYSTOLIC BLOOD PRESSURE: 148 MMHG

## 2024-07-24 DIAGNOSIS — N28.89 RENAL MASS, LEFT: Primary | ICD-10-CM

## 2024-07-24 LAB
GLUCOSE BLDC GLUCOMTR-MCNC: 143 MG/DL (ref 70–99)
GLUCOSE BLDC GLUCOMTR-MCNC: 182 MG/DL (ref 70–99)
NON GYNE INTERPRETATION: NEGATIVE

## 2024-07-24 PROCEDURE — BT1F1ZZ FLUOROSCOPY OF LEFT KIDNEY, URETER AND BLADDER USING LOW OSMOLAR CONTRAST: ICD-10-PCS | Performed by: UROLOGY

## 2024-07-24 PROCEDURE — 82962 GLUCOSE BLOOD TEST: CPT

## 2024-07-24 PROCEDURE — 0T778DZ DILATION OF LEFT URETER WITH INTRALUMINAL DEVICE, VIA NATURAL OR ARTIFICIAL OPENING ENDOSCOPIC: ICD-10-PCS | Performed by: UROLOGY

## 2024-07-24 PROCEDURE — 88108 CYTOPATH CONCENTRATE TECH: CPT | Performed by: UROLOGY

## 2024-07-24 PROCEDURE — 0T978ZX DRAINAGE OF LEFT URETER, VIA NATURAL OR ARTIFICIAL OPENING ENDOSCOPIC, DIAGNOSTIC: ICD-10-PCS | Performed by: UROLOGY

## 2024-07-24 DEVICE — URETERAL STENT
Type: IMPLANTABLE DEVICE | Site: URETER | Status: FUNCTIONAL
Brand: ASCERTA™

## 2024-07-24 RX ORDER — SODIUM CHLORIDE, SODIUM LACTATE, POTASSIUM CHLORIDE, CALCIUM CHLORIDE 600; 310; 30; 20 MG/100ML; MG/100ML; MG/100ML; MG/100ML
INJECTION, SOLUTION INTRAVENOUS CONTINUOUS
Status: DISCONTINUED | OUTPATIENT
Start: 2024-07-24 | End: 2024-07-24

## 2024-07-24 RX ORDER — NALOXONE HYDROCHLORIDE 0.4 MG/ML
0.08 INJECTION, SOLUTION INTRAMUSCULAR; INTRAVENOUS; SUBCUTANEOUS AS NEEDED
Status: DISCONTINUED | OUTPATIENT
Start: 2024-07-24 | End: 2024-07-24

## 2024-07-24 RX ORDER — DEXTROSE MONOHYDRATE 25 G/50ML
50 INJECTION, SOLUTION INTRAVENOUS
Status: DISCONTINUED | OUTPATIENT
Start: 2024-07-24 | End: 2024-07-24

## 2024-07-24 RX ORDER — HYDROMORPHONE HYDROCHLORIDE 1 MG/ML
0.6 INJECTION, SOLUTION INTRAMUSCULAR; INTRAVENOUS; SUBCUTANEOUS EVERY 5 MIN PRN
Status: DISCONTINUED | OUTPATIENT
Start: 2024-07-24 | End: 2024-07-24

## 2024-07-24 RX ORDER — NICOTINE POLACRILEX 4 MG
15 LOZENGE BUCCAL
Status: DISCONTINUED | OUTPATIENT
Start: 2024-07-24 | End: 2024-07-24

## 2024-07-24 RX ORDER — TRAMADOL HYDROCHLORIDE 50 MG/1
50 TABLET ORAL EVERY 4 HOURS PRN
Qty: 10 TABLET | Refills: 0 | Status: CANCELLED | OUTPATIENT
Start: 2024-07-24

## 2024-07-24 RX ORDER — IOPAMIDOL 612 MG/ML
INJECTION, SOLUTION INTRATHECAL AS NEEDED
Status: DISCONTINUED | OUTPATIENT
Start: 2024-07-24 | End: 2024-07-24 | Stop reason: HOSPADM

## 2024-07-24 RX ORDER — HYDROMORPHONE HYDROCHLORIDE 1 MG/ML
0.4 INJECTION, SOLUTION INTRAMUSCULAR; INTRAVENOUS; SUBCUTANEOUS EVERY 5 MIN PRN
Status: DISCONTINUED | OUTPATIENT
Start: 2024-07-24 | End: 2024-07-24

## 2024-07-24 RX ORDER — ACETAMINOPHEN 500 MG
1000 TABLET ORAL ONCE
Status: DISCONTINUED | OUTPATIENT
Start: 2024-07-24 | End: 2024-07-24 | Stop reason: HOSPADM

## 2024-07-24 RX ORDER — ACETAMINOPHEN 500 MG
1000 TABLET ORAL ONCE
Status: COMPLETED | OUTPATIENT
Start: 2024-07-24 | End: 2024-07-24

## 2024-07-24 RX ORDER — DEXAMETHASONE SODIUM PHOSPHATE 4 MG/ML
VIAL (ML) INJECTION AS NEEDED
Status: DISCONTINUED | OUTPATIENT
Start: 2024-07-24 | End: 2024-07-24 | Stop reason: SURG

## 2024-07-24 RX ORDER — MORPHINE SULFATE 4 MG/ML
2 INJECTION, SOLUTION INTRAMUSCULAR; INTRAVENOUS EVERY 10 MIN PRN
Status: DISCONTINUED | OUTPATIENT
Start: 2024-07-24 | End: 2024-07-24

## 2024-07-24 RX ORDER — EPHEDRINE SULFATE 50 MG/ML
INJECTION INTRAVENOUS AS NEEDED
Status: DISCONTINUED | OUTPATIENT
Start: 2024-07-24 | End: 2024-07-24 | Stop reason: SURG

## 2024-07-24 RX ORDER — CITRIC ACID/SODIUM CITRATE 334-500MG
30 SOLUTION, ORAL ORAL ONCE
Status: DISCONTINUED | OUTPATIENT
Start: 2024-07-24 | End: 2024-07-24 | Stop reason: HOSPADM

## 2024-07-24 RX ORDER — TRAMADOL HYDROCHLORIDE 50 MG/1
50 TABLET ORAL EVERY 4 HOURS PRN
Qty: 10 TABLET | Refills: 0 | Status: SHIPPED | OUTPATIENT
Start: 2024-07-24

## 2024-07-24 RX ORDER — NICOTINE POLACRILEX 4 MG
30 LOZENGE BUCCAL
Status: DISCONTINUED | OUTPATIENT
Start: 2024-07-24 | End: 2024-07-24

## 2024-07-24 RX ORDER — GENTAMICIN 40 MG/ML
INJECTION, SOLUTION INTRAMUSCULAR; INTRAVENOUS AS NEEDED
Status: DISCONTINUED | OUTPATIENT
Start: 2024-07-24 | End: 2024-07-24 | Stop reason: HOSPADM

## 2024-07-24 RX ORDER — LIDOCAINE HYDROCHLORIDE 10 MG/ML
INJECTION, SOLUTION EPIDURAL; INFILTRATION; INTRACAUDAL; PERINEURAL AS NEEDED
Status: DISCONTINUED | OUTPATIENT
Start: 2024-07-24 | End: 2024-07-24 | Stop reason: SURG

## 2024-07-24 RX ORDER — MORPHINE SULFATE 4 MG/ML
4 INJECTION, SOLUTION INTRAMUSCULAR; INTRAVENOUS EVERY 10 MIN PRN
Status: DISCONTINUED | OUTPATIENT
Start: 2024-07-24 | End: 2024-07-24

## 2024-07-24 RX ORDER — ONDANSETRON 2 MG/ML
INJECTION INTRAMUSCULAR; INTRAVENOUS AS NEEDED
Status: DISCONTINUED | OUTPATIENT
Start: 2024-07-24 | End: 2024-07-24 | Stop reason: SURG

## 2024-07-24 RX ORDER — PHENAZOPYRIDINE HYDROCHLORIDE 100 MG/1
100 TABLET, FILM COATED ORAL 3 TIMES DAILY PRN
Qty: 30 TABLET | Refills: 0 | Status: SHIPPED | OUTPATIENT
Start: 2024-07-24

## 2024-07-24 RX ORDER — GLYCOPYRROLATE 0.2 MG/ML
INJECTION, SOLUTION INTRAMUSCULAR; INTRAVENOUS AS NEEDED
Status: DISCONTINUED | OUTPATIENT
Start: 2024-07-24 | End: 2024-07-24 | Stop reason: SURG

## 2024-07-24 RX ORDER — HYDROMORPHONE HYDROCHLORIDE 1 MG/ML
0.2 INJECTION, SOLUTION INTRAMUSCULAR; INTRAVENOUS; SUBCUTANEOUS EVERY 5 MIN PRN
Status: DISCONTINUED | OUTPATIENT
Start: 2024-07-24 | End: 2024-07-24

## 2024-07-24 RX ORDER — MORPHINE SULFATE 10 MG/ML
6 INJECTION, SOLUTION INTRAMUSCULAR; INTRAVENOUS EVERY 10 MIN PRN
Status: DISCONTINUED | OUTPATIENT
Start: 2024-07-24 | End: 2024-07-24

## 2024-07-24 RX ADMIN — CEFAZOLIN SODIUM 1 G: 1 INJECTION, POWDER, FOR SOLUTION INTRAMUSCULAR; INTRAVENOUS at 10:03:00

## 2024-07-24 RX ADMIN — ONDANSETRON 4 MG: 2 INJECTION INTRAMUSCULAR; INTRAVENOUS at 09:59:00

## 2024-07-24 RX ADMIN — GLYCOPYRROLATE 0.2 MG: 0.2 INJECTION, SOLUTION INTRAMUSCULAR; INTRAVENOUS at 09:59:00

## 2024-07-24 RX ADMIN — EPHEDRINE SULFATE 5 MG: 50 INJECTION INTRAVENOUS at 10:41:00

## 2024-07-24 RX ADMIN — SODIUM CHLORIDE, SODIUM LACTATE, POTASSIUM CHLORIDE, CALCIUM CHLORIDE: 600; 310; 30; 20 INJECTION, SOLUTION INTRAVENOUS at 09:54:00

## 2024-07-24 RX ADMIN — SODIUM CHLORIDE, SODIUM LACTATE, POTASSIUM CHLORIDE, CALCIUM CHLORIDE: 600; 310; 30; 20 INJECTION, SOLUTION INTRAVENOUS at 10:47:00

## 2024-07-24 RX ADMIN — LIDOCAINE HYDROCHLORIDE 25 MG: 10 INJECTION, SOLUTION EPIDURAL; INFILTRATION; INTRACAUDAL; PERINEURAL at 09:58:00

## 2024-07-24 RX ADMIN — EPHEDRINE SULFATE 5 MG: 50 INJECTION INTRAVENOUS at 10:16:00

## 2024-07-24 RX ADMIN — DEXAMETHASONE SODIUM PHOSPHATE 4 MG: 4 MG/ML VIAL (ML) INJECTION at 09:59:00

## 2024-07-24 RX ADMIN — SODIUM CHLORIDE, SODIUM LACTATE, POTASSIUM CHLORIDE, CALCIUM CHLORIDE: 600; 310; 30; 20 INJECTION, SOLUTION INTRAVENOUS at 11:07:00

## 2024-07-24 NOTE — H&P
[facility] ADULT HISTORY AND PHYSICAL      IDENTIFYING DATA  Patient is Zenia dawkins 74 year old female. MRN is [id number]    Admitting physician: Yesenia Goyal MD  Primary care physician: Arnel Dickerson MD    CHIEF COMPLAINT  No chief complaint on file.            HISTORY OF PRESENT ILLNESS  74 year old female presents with left ureteral lesion.    PAST UROLOGICAL HISTORY BY ORGAN SYSTEM  See HPI    PAST MEDICAL HISTORY  Past Medical History:    Basal cell carcinoma    Celiac disease (HCC)    Disorder of thyroid    Essential hypertension    Hearing decreased, bilateral    bilat hearing aides    High blood pressure    High cholesterol    Hyperchloremia    Hyperlipidemia    Hypothyroid    Microscopic colitis    PONV (postoperative nausea and vomiting)    Solitary kidney    Type 1 diabetes mellitus (HCC)    sees Dr. Tinajero, with retinopathy             PAST SURGICAL HISTORY  Past Surgical History:   Procedure Laterality Date          Colonoscopy      Needle biopsy left Left         Stapedectomy         PAST FAMILY HISTORY  Family History   Problem Relation Age of Onset    Ovarian Cancer Maternal Grandmother         70s    Breast Cancer Neg        PAST SOCIAL HISTORY  Social History     Socioeconomic History    Marital status:      Spouse name: Not on file    Number of children: Not on file    Years of education: Not on file    Highest education level: Not on file   Occupational History    Not on file   Tobacco Use    Smoking status: Never    Smokeless tobacco: Never   Vaping Use    Vaping status: Never Used   Substance and Sexual Activity    Alcohol use: Not Currently     Comment: very rare    Drug use: Never    Sexual activity: Not on file   Other Topics Concern    Not on file   Social History Narrative    Not on file     Social Determinants of Health     Financial Resource Strain: Not on file   Food Insecurity: Not on file   Transportation Needs: Not on file   Physical  Activity: Not on file   Stress: Not on file   Social Connections: Not on file   Housing Stability: Not on file       MEDICATIONS  No current outpatient medications on file.    ALLERGIES  Allergies   Allergen Reactions    Gluten Flour UNKNOWN    Seasonal OTHER (SEE COMMENTS)          REVIEW OF SYSTEMS  Constitutional symptoms:(-) fever, (-) chills (-) headache  Eyes: (-) blurred vision, (-) double vision   Neurological: (-) tremors, (-) dizzy spells  (-) numbness/tingling  Endocrine: (-) feeling too hot/cold (-)  tired/sluggish  Gastrointestinal:(-)  abdominal pain   (-) nausea/vomiting (-) indigestion  Cardiovascular: (-) chest pain, (-) palpitations (-) HTN  Integumentary: (-)  skin rash (-) persistent itch  Musculoskeletal: (-) joint pain, (-) neck pain (-) back pain  Ear/Nose/Throat/Mouth:  (-) sore throat (-) sinus problems  Genitourinary:see HPI  Respiratory:  (-) problems  wheezing (-) frequent cough (-) SOB (-) SHUKLA  Hematologic/Lymphatic: (-)  swollen glands (-) blood clotting problem    PHYSICAL EXAMINATIONS    Vital Signs:   Vitals:    07/13/24 1020 07/24/24 0937   BP:  142/61   BP Location:  Right arm   Pulse:  77   Resp:  18   SpO2:  97%   Weight: 121 lb (54.9 kg) 113 lb (51.3 kg)   Height: 5' 3\" (1.6 m) 5' 3\" (1.6 m)       Constitutional: General appearance: appears well, alert and oriented x 3, pleasant and cooperative.  Neuro: No gross deficits  Skin: Normal color, turgor  HEENT: Neck supple  Chest: Normal respiratory effort  CV: Normal radial pulse  Abdomen: Soft without tenderness, guarding. No suprapubic tenderness or fullness  No CVA tenderness bilaterally  Extremities: No edema appreciated.      REVIEW OF LABORATORY, PATHOLOGY, AND RADIOLOGY DATA    CBC w/DIF:   Lab Results   Component Value Date    WBC 7.4 07/18/2024    RBC 3.50 (L) 07/18/2024    HGB 11.0 (L) 07/18/2024    HCT 31.9 (L) 07/18/2024    MCV 91.1 07/18/2024    MCH 31.4 07/18/2024    MCHC 34.5 07/18/2024    RDW 13.2 07/18/2024    PLT  272.0 07/18/2024       CMP:  No results found for: \"CR\", \"EGFR\"    PSA:  No components found for: \"GPSAD\"    UA:  No components found for: \"GUCOL\", \"GUCLR\", \"GUSG\", \"GUPH\", \"GUPRO\", \"GUGLUC\", \"GUKET\", \"GUBLD\", \"GUBILI\", \"GUNITR\", \"GULEU\", \"GUURO\", \"GUWBC\", \"GURBC\", \"GUSQEP\", \"GUNSEP\", \"GUBACT\", \"GUAMOR\", \"GUHC\"    Urine culture:  Reviewed    Imaging:  No results found.    ASSESSMENT AND PLAN   74 year old female presents with left ureteral lesion seen on imaging.  Possible artifact but needs eval.    To OR for diagnostic left ureteroscopy possible biopsy  Ancef 1g  Anticipate discharge home postop        Yesenia Goyal MD  [facility]   O: [phone]

## 2024-07-24 NOTE — ANESTHESIA PROCEDURE NOTES
Airway  Date/Time: 7/24/2024 10:01 AM  Urgency: Elective    Airway not difficult    General Information and Staff    Patient location during procedure: OR  Anesthesiologist: Sukhjinder Monreal MD  Resident/CRNA: Loren Morgan CRNA  Performed: CRNA   Performed by: Loren Morgan CRNA  Authorized by: Sukhjinder Monreal MD      Indications and Patient Condition  Indications for airway management: anesthesia  Spontaneous Ventilation: absent  Sedation level: deep  Preoxygenated: yes  Patient position: sniffing  MILS maintained throughout  Mask difficulty assessment: 1 - vent by mask    Final Airway Details  Final airway type: supraglottic airway      Successful airway: classic  Size 4       Number of attempts at approach: 1

## 2024-07-24 NOTE — ANESTHESIA PREPROCEDURE EVALUATION
Anesthesia PreOp Note    HPI:     Zenia Mcduffie is a 74 year old female who presents for preoperative consultation requested by: Yesenia Goyal MD    Date of Surgery: 2024    Procedure(s):  Cystoscopy, left retrograde pyelogram, diagnostic left ureteroscopy with possible biopsy, left ureteral stent insertion  CYSTOSCOPY URETEROSCOPY  CYSTOSCOPY STENT INSERTION  Indication: Left renal mass    Relevant Problems   No relevant active problems       NPO:                         History Review:  Patient Active Problem List    Diagnosis Date Noted    Type 1 diabetes mellitus (HCC)     Essential hypertension     Celiac disease (HCC)     Hypothyroid     Hyperlipidemia        Past Medical History:    Basal cell carcinoma    Celiac disease (HCC)    Disorder of thyroid    Essential hypertension    Hearing decreased, bilateral    bilat hearing aides    High blood pressure    High cholesterol    Hyperchloremia    Hyperlipidemia    Hypothyroid    Microscopic colitis    PONV (postoperative nausea and vomiting)    Solitary kidney    Type 1 diabetes mellitus (HCC)    sees Dr. Tinajero, with retinopathy       Past Surgical History:   Procedure Laterality Date          Colonoscopy      Needle biopsy left Left         Stapedectomy         Medications Prior to Admission   Medication Sig Dispense Refill Last Dose    Finerenone (KERENDIA) 10 MG Oral Tab Take 10 mg by mouth daily.       atorvastatin 40 MG Oral Tab Take 1 tablet (40 mg total) by mouth nightly.       Turmeric 500 MG Oral Cap Take 1,000 mg by mouth daily.       MAGNESIUM OXIDE 400 MG Oral Tab TAKE 1 TABLET BY MOUTH EVERY DAY 90 tablet 1     FOLBEE 2.5-25-1 MG Oral Tab TAKE 1 TABLET BY MOUTH EVERY DAY 90 tablet 1     OLMESARTAN MEDOXOMIL 40 MG Oral Tab TAKE 1 TABLET BY MOUTH EVERY DAY (Patient taking differently: Take 1 tablet (40 mg total) by mouth every morning.) 90 tablet 1     LEVOTHYROXINE 75 MCG Oral Tab TAKE 1 TABLET BY MOUTH EVERY DAY 90  tablet 1     HYDROCHLOROTHIAZIDE 25 MG Oral Tab TAKE 1 TABLET BY MOUTH EVERY DAY 90 tablet 1     OMEGA-3-ACID ETHYL ESTERS 1 g Oral Cap TAKE 4 CAPSULES (4 G TOTAL) BY MOUTH DAILY. (Patient taking differently: Take 2 capsules (2 g total) by mouth 2 (two) times daily.) 360 capsule 1     Insulin Degludec (TRESIBA) 100 UNIT/ML Subcutaneous Solution Inject 11 Units into the skin every evening.  0     Insulin Pen Needle (BD PEN NEEDLE MINI U/F) 31G X 5 MM Does not apply Misc USE 4 TIMES DAILY.. Insulin Dependent Diabetes Type II. Z79.4, E11.9. 400 each 3     Insulin Aspart (NOVOLOG FLEXPEN SC) Inject into the skin. Sliding scale       Cholecalciferol (HM VITAMIN D3) 4000 units Oral Cap Take 1 capsule by mouth every 7 days.       Vitamin B-12 1000 MCG Oral Tab Take 1 tablet (1,000 mcg total) by mouth daily.       Biotin (BIOTIN 5000) 5 MG Oral Cap Take 5,000 mg by mouth daily.       Multiple Vitamins-Minerals (MULTI FOR HER) Oral Cap Take 1 capsule by mouth.       Calcium Citrate-Vitamin D (CITRACAL + D OR) Take 1 capsule by mouth 2 (two) times daily.       ACCU-CHEK COMPACT PLUS In Vitro Strip by Finger stick route As Directed. Use 6-8 times daily  1      Current Facility-Administered Medications Ordered in Epic   Medication Dose Route Frequency Provider Last Rate Last Admin    lactated ringers infusion   Intravenous Continuous Yesenia Goyal MD        acetaminophen (Tylenol Extra Strength) tab 1,000 mg  1,000 mg Oral Once Yesenia Goyal MD        ceFAZolin (Ancef) injection 1 g  1 g Intravenous On Call to OR Yesenia Goyal MD         No current [facility]-ordered outpatient medications on file.       Allergies   Allergen Reactions    Gluten Flour UNKNOWN    Seasonal OTHER (SEE COMMENTS)       Family History   Problem Relation Age of Onset    Ovarian Cancer Maternal Grandmother         70s    Breast Cancer Neg      Social History     Socioeconomic History    Marital status:    Tobacco Use    Smoking status:  Never    Smokeless tobacco: Never   Vaping Use    Vaping status: Never Used   Substance and Sexual Activity    Alcohol use: Not Currently     Comment: very rare    Drug use: Never       Available pre-op labs reviewed.  Lab Results   Component Value Date    WBC 7.4 07/18/2024    RBC 3.50 (L) 07/18/2024    HGB 11.0 (L) 07/18/2024    HCT 31.9 (L) 07/18/2024    MCV 91.1 07/18/2024    MCH 31.4 07/18/2024    MCHC 34.5 07/18/2024    RDW 13.2 07/18/2024    .0 07/18/2024     Lab Results   Component Value Date     (L) 07/18/2024    K 4.7 07/18/2024     07/18/2024    CO2 25.0 07/18/2024    BUN 29 (H) 07/18/2024    CREATSERUM 1.03 (H) 07/18/2024     (H) 07/18/2024    CA 9.6 07/18/2024          Vital Signs:  Body mass index is 21.43 kg/m².   height is 1.6 m (5' 3\") and weight is 54.9 kg (121 lb).   Vitals:    07/13/24 1020   Weight: 54.9 kg (121 lb)   Height: 1.6 m (5' 3\")        Anesthesia Evaluation     Patient summary reviewed and Nursing notes reviewed    History of anesthetic complications (PONV)   Airway   Mallampati: II  Dental - Dentition appears grossly intact     Pulmonary - negative ROS    breath sounds clear to auscultation  Cardiovascular   Exercise tolerance: good  (+) hypertension    Rhythm: regular  Rate: normal    Neuro/Psych - negative ROS     GI/Hepatic/Renal - negative ROS     Endo/Other    (+) diabetes mellitus well controlled using insulin  Abdominal                  Anesthesia Plan:   ASA:  3  Plan:   General  Airway:  LMA  Post-op Pain Management: IV analgesics  Informed Consent Plan and Risks Discussed With:  Patient, spouse and child/children      I have informed Zenia TONY Froy and/or legal guardian or family member of the nature of the anesthetic plan, benefits, risks including possible dental damage if relevant, major complications, and any alternative forms of anesthetic management.   All of the patient's questions were answered to the best of my ability. The patient  desires the anesthetic management as planned.  KAITY FORBES MD  7/24/2024 9:26 AM  Present on Admission:  **None**

## 2024-07-24 NOTE — OPERATIVE REPORT
[facility]     PATIENT'S NAME: Zenia Mcduffie   ATTENDING PHYSICIAN: Yesenia Goyal MD   OPERATING PHYSICIAN: Yesenia Goyal MD     MRN: [id number]  : 1950  DATE OF OPERATION: 2024    OPERATIVE REPORT        PREOPERATIVE DIAGNOSIS:  Left ureteral lesion    POSTOPERATIVE DIAGNOSIS:  Left ureteral J hook    PROCEDURE PERFORMED:  Cystoscopy, left retrograde pyelogram, left diagnostic ureteroscopy, left ureteral stent insertion     ANESTHESIA:  General     ESTIMATED BLOOD LOSS:  Minimal     INTRAVENOUS FLUIDS:  See anesthesia record.      URINE OUTPUT:  Not measured     COMPLICATIONS:   None     DRAINS:  Left 6 Fr x 26 cm ureteral stent     SPECIMENS:  Left ureteral wash for cytology     DISPOSITION:  Stable to recovery room.     INDICATIONS:  The patient is a 74 year old-year-old female with an incidental finding of left renal mass and a solitary left kidney.  Contrast enhanced CT showed possibility of a left ureteral filling defect versus soft tissue lesion in the proximal ureter.  She is brought to the operating room today for further endoscopic evaluation.  We discussed the risks of ureteroscopy including bleeding, infection, damage to surrounding structures, ureteral injury, nondiagnostic evaluation, need for ureteral stent and stent related complications, need for additional procedures, and others.  All questions were answered and she agreed to proceed.     FINDINGS: Normal cystourethroscopy.  Mild left hydronephrosis on retrograde pyelogram with short segment but tight narrowing in the proximal ureter, appearing distal to the UPJ.  There was prompt drainage of contrast however and the system did not appear to be obstructed.  On ureteroscopy, very tight kink in the proximal ureter.  Unable to bypassed with the scope.  No apparent urothelial lesions.     PROCEDURE:  Informed consent was obtained. The patient was brought to the operative suite where general anesthesia was administered.  IV  antibiotics were administered and SCDs were applied.  She was placed in the dorsolithotomy position, prepped, and draped in usual fashion.  After timeout was completed, the rigid cystoscope was inserted per urethra and advanced into the bladder under direct vision.  The bladder was thoroughly inspected and no mucosal abnormalities were seen.  There was a solitary ureteral orifice on the left side consistent with known congenital solitary kidney.  The orifice was cannulated with a 6 Setswana ureteral catheter which was advanced into the mid ureter.  A pullback retrograde pyelogram was performed.  Findings were as above.  A sensor wire was advanced and coiled in the upper pole of the kidney.  I then dilated the ureteral orifice with a Eloise one-step ureteral dilator.  Next over the wire I advanced a flexible ureteroscope without difficulty into the proximal ureter where resistance was met.  The wire was removed and I carefully inspected the ureter in this area.  There was distally a normal caliber ureter but proximally there was a very tight area which appeared to be a kink/J-hook.  I gently attempted to navigate this tortuosity but was unable to do so.  I then advanced a wire through the scope past this narrow area.  With the wire in place I then attempted to advance the scope over the wire but again there was significant resistance and the scope would not pass.  I replaced the wire with a Super Stiff wire and again was unable to advance the ureteroscope further past this area of narrowing.  I then inserted a brush biopsy through the scope and attempted to perform a brush biopsy of this narrow area however the brush biopsy similarly would not pass.  I then performed a wash of this area and sent this ureteral wash for cytology.  The ureteroscope was then slowly removed.  Again the rest of the ureter appeared very normal.  Finally a 6 Setswana x 24 cm ureteral stent was placed in usual fashion.  Unfortunately, the  stent appeared to be too short so I advanced a 6 Divehi x 26 cm ureteral stent.  Upon removal of the wire, the proximal and distal curls of the stent were seen in appropriate position.  The bladder was emptied and the scope was removed.  The patient was returned to the supine position and awakened.  She was taken the recovery room in stable condition having tolerated the procedure well.

## 2024-07-24 NOTE — ANESTHESIA POSTPROCEDURE EVALUATION
Patient: Zenia Mcduffie    Procedure Summary       Date: 07/24/24 Room / Location: [facility] MAIN OR  / [facility] MAIN OR    Anesthesia Start: 0954 Anesthesia Stop:     Procedures:       Cystoscopy, left retrograde pyelogram, diagnostic left ureteroscopy, left ureteral stent insertion (Left: Ureter)      CYSTOSCOPY URETEROSCOPY (Left: Ureter)      CYSTOSCOPY STENT INSERTION (Left: Ureter) Diagnosis: (Left renal mass)    Surgeons: Yesenia Goyal MD Anesthesiologist: Kaity Forbes MD    Anesthesia Type: general ASA Status: 3            Anesthesia Type: general    Vitals Value Taken Time   /69 07/24/24 1110   Temp 97.2 °F (36.2 °C) 07/24/24 1110   Pulse 71 07/24/24 1111   Resp 21 07/24/24 1111   SpO2 100 % 07/24/24 1111   Vitals shown include unfiled device data.    [facility] AN Post Evaluation:   Patient Evaluated in PACU  Patient Participation: complete - patient participated  Level of Consciousness: responsive to verbal stimuli  Pain Score: 0  Pain Management: adequate  Airway Patency:patent  Dental exam unchanged from preop  Yes    Cardiovascular Status: acceptable  Respiratory Status: acceptable  Postoperative Hydration acceptable      KAITY FORBES MD  7/24/2024 11:12 AM

## 2024-07-30 ENCOUNTER — OFFICE VISIT (OUTPATIENT)
Dept: HEMATOLOGY/ONCOLOGY | Facility: HOSPITAL | Age: 74
End: 2024-07-30
Attending: INTERNAL MEDICINE
Payer: MEDICARE

## 2024-07-30 VITALS
TEMPERATURE: 98 F | BODY MASS INDEX: 20.91 KG/M2 | DIASTOLIC BLOOD PRESSURE: 66 MMHG | RESPIRATION RATE: 16 BRPM | WEIGHT: 118 LBS | SYSTOLIC BLOOD PRESSURE: 158 MMHG | HEIGHT: 63 IN | OXYGEN SATURATION: 100 % | HEART RATE: 67 BPM

## 2024-07-30 DIAGNOSIS — F41.9 ANXIETY DUE TO INVASIVE PROCEDURE: ICD-10-CM

## 2024-07-30 DIAGNOSIS — R91.1 PULMONARY NODULE, RIGHT: ICD-10-CM

## 2024-07-30 DIAGNOSIS — N28.89 LEFT RENAL MASS: Primary | ICD-10-CM

## 2024-07-30 DIAGNOSIS — K86.2 PANCREATIC CYST (HCC): ICD-10-CM

## 2024-07-30 PROCEDURE — 99204 OFFICE O/P NEW MOD 45 MIN: CPT | Performed by: INTERNAL MEDICINE

## 2024-07-30 RX ORDER — LORAZEPAM 0.5 MG/1
TABLET ORAL
Qty: 2 TABLET | Refills: 0 | Status: SHIPPED | OUTPATIENT
Start: 2024-07-30

## 2024-07-30 NOTE — CONSULTS
Zenia Mcduffie is a 74 year old female here at the request of Arnel Dickerson MD for evaluation of Left renal mass    Pancreatic cyst (HCC)    Pulmonary nodule, right.    Patient had CT for calcium score and the CT over read on 4/28/2024 showed a 7 x 8 mm right lower lobe solid pulmonary nodule.  This has not been present compared to an outside hospital chest, abdomen, pelvis, CT from 5/29/2012.  CT was recommended for further evaluation.  In addition, there was a thick walled a low-density exophytic left renal lesion which measured 3.2 x 3.7 cm.  She has congenital absence of the R kidney.    On 6/15/2024 patient then underwent a CT scan of the chest, abdomen and pelvis, which revealed a 4 cm centrally necrotic mass in the left upper pole of the kidney suspicious for renal cell carcinoma.  There was filling defect within the proximal right ureter which is indeterminate.  Transitional cell carcinoma cannot be excluded.  In addition, there is a 1.4 cm cystic lesion in the pancreatic uncinate process with enhancing mural nodule suspicious for cystic pancreatic malignancy.  There is a 9 mm nodule in the right lower lobe which is suspicious for pulmonary metastases. She endorses a history of bronchitis/pneumonia in April of 2024 while in [place].  She had CXR and told had nodule on the left.      Acute renal findings, patient was referred to urology for uteroscopy.  This was performed on 7/24/2024, left ureteral stent was inserted, and cytology from the left ureteral wash has scant cellularity, and was negative for any high-grade urothelial carcinoma.  Patient has an appointment set up to see Dr. Salomon tomorrow for surgical evaluation, regarding left renal mass..    For the pancreatic cyst, will undergo EUS on 8/15/2024.  Had evaluation yesterday with Dr. Mota.    ECOG PS 0      Review of Systems:   Review of Systems   Constitutional:  Negative for appetite change, fatigue, fever and unexpected weight change.    HENT:   Positive for hearing loss.    Respiratory:  Negative for cough and shortness of breath.    Cardiovascular:  Negative for chest pain.   Gastrointestinal:  Negative for abdominal pain, constipation and diarrhea.   Genitourinary:  Negative for dysuria, frequency and hematuria.    Musculoskeletal:  Positive for arthralgias (hands). Negative for back pain and neck pain.   Neurological:  Negative for dizziness and headaches.   Hematological:  Negative for adenopathy. Does not bruise/bleed easily.   Psychiatric/Behavioral:  Negative for sleep disturbance. The patient is nervous/anxious.            Current Outpatient Medications   Medication Sig Dispense Refill    phenazopyridine 100 MG Oral Tab Take 1 tablet (100 mg total) by mouth 3 (three) times daily as needed for Pain (burning with urination). 30 tablet 0    Finerenone (KERENDIA) 10 MG Oral Tab Take 10 mg by mouth daily.      atorvastatin 40 MG Oral Tab Take 1 tablet (40 mg total) by mouth nightly.      Turmeric 500 MG Oral Cap Take 1,000 mg by mouth daily.      MAGNESIUM OXIDE 400 MG Oral Tab TAKE 1 TABLET BY MOUTH EVERY DAY 90 tablet 1    FOLBEE 2.5-25-1 MG Oral Tab TAKE 1 TABLET BY MOUTH EVERY DAY 90 tablet 1    OLMESARTAN MEDOXOMIL 40 MG Oral Tab TAKE 1 TABLET BY MOUTH EVERY DAY (Patient taking differently: Take 1 tablet (40 mg total) by mouth every morning.) 90 tablet 1    LEVOTHYROXINE 75 MCG Oral Tab TAKE 1 TABLET BY MOUTH EVERY DAY 90 tablet 1    HYDROCHLOROTHIAZIDE 25 MG Oral Tab TAKE 1 TABLET BY MOUTH EVERY DAY 90 tablet 1    OMEGA-3-ACID ETHYL ESTERS 1 g Oral Cap TAKE 4 CAPSULES (4 G TOTAL) BY MOUTH DAILY. (Patient taking differently: Take 2 capsules (2 g total) by mouth 2 (two) times daily.) 360 capsule 1    Insulin Degludec (TRESIBA) 100 UNIT/ML Subcutaneous Solution Inject 11 Units into the skin every evening.  0    Insulin Pen Needle (BD PEN NEEDLE MINI U/F) 31G X 5 MM Does not apply Misc USE 4 TIMES DAILY.. Insulin Dependent Diabetes Type II.  Z79.4, E11.9. 400 each 3    Insulin Aspart (NOVOLOG FLEXPEN SC) Inject into the skin. Sliding scale      Cholecalciferol (HM VITAMIN D3) 4000 units Oral Cap Take 1 capsule by mouth every 7 days.      Vitamin B-12 1000 MCG Oral Tab Take 1 tablet (1,000 mcg total) by mouth daily.      Biotin (BIOTIN 5000) 5 MG Oral Cap Take 5,000 mg by mouth daily.      Multiple Vitamins-Minerals (MULTI FOR HER) Oral Cap Take 1 capsule by mouth.      ACCU-CHEK COMPACT PLUS In Vitro Strip by Finger stick route As Directed. Use 6-8 times daily  1    traMADol 50 MG Oral Tab Take 1 tablet (50 mg total) by mouth every 4 (four) hours as needed for Pain. (Patient not taking: Reported on 2024) 10 tablet 0    Calcium Citrate-Vitamin D (CITRACAL + D OR) Take 1 capsule by mouth 2 (two) times daily. (Patient not taking: Reported on 2024)       Allergies:   Allergies   Allergen Reactions    Gluten Flour UNKNOWN    Seasonal OTHER (SEE COMMENTS)       Past Medical History:    Basal cell carcinoma    Celiac disease (HCC)    Disorder of thyroid    Essential hypertension    Hearing decreased, bilateral    bilat hearing aides    High blood pressure    High cholesterol    Hyperchloremia    Hyperlipidemia    Hypothyroid    Microscopic colitis    PONV (postoperative nausea and vomiting)    Solitary kidney    Type 1 diabetes mellitus (HCC)    sees Dr. Tinajero, with retinopathy     Past Surgical History:   Procedure Laterality Date          Colonoscopy      Needle biopsy left Left         Stapedectomy       Social History     Socioeconomic History    Marital status:      Spouse name: Not on file    Number of children: Not on file    Years of education: Not on file    Highest education level: Not on file   Occupational History    Not on file   Tobacco Use    Smoking status: Never    Smokeless tobacco: Never   Vaping Use    Vaping status: Never Used   Substance and Sexual Activity    Alcohol use: Not Currently     Comment:  very rare    Drug use: Never    Sexual activity: Not on file   Other Topics Concern    Not on file   Social History Narrative    Not on file     Social Determinants of Health     Financial Resource Strain: Not on file   Food Insecurity: Not on file   Transportation Needs: Not on file   Physical Activity: Not on file   Stress: Not on file   Social Connections: Not on file   Housing Stability: Not on file     Family History   Problem Relation Age of Onset    Ovarian Cancer Maternal Grandmother         70s    Breast Cancer Neg          PHYSICAL EXAM:    /66 (BP Location: Left arm, Patient Position: Sitting, Cuff Size: adult)   Pulse 67   Temp 97.7 °F (36.5 °C) (Oral)   Resp 16   Ht 1.6 m (5' 3\")   Wt 53.5 kg (118 lb)   SpO2 100%   BMI 20.90 kg/m²   Wt Readings from Last 6 Encounters:   07/30/24 53.5 kg (118 lb)   07/24/24 51.3 kg (113 lb)   07/02/24 55 kg (121 lb 3.2 oz)   09/01/21 55.2 kg (121 lb 12.8 oz)   07/12/21 56.7 kg (125 lb)   06/26/20 56.6 kg (124 lb 12.8 oz)     Physical Exam  General: Patient is alert, not in acute distress.  HEENT: EOMs intact. PERRL.   Neck: No JVD. No palpable lymphadenopathy. Neck is supple.  Chest: Clear to auscultation.  Heart: Regular rate and rhythm.   Abdomen: Soft, non tender with good bowel sounds.  Extremities: No edema.  Neurological: Grossly intact.   Lymphatics: There is no palpable lymphadenopathy throughout in the cervical, supraclavicular, axillary, or inguinal regions.  Psych/Depression: nl        ASSESSMENT/PLAN:     1. Left renal mass    2. Pancreatic cyst (HCC)    3. Pulmonary nodule, right      Mri from 2012.      No orders of the defined types were placed in this encounter.      Results From Past 48 Hours:  No results found for this or any previous visit (from the past 48 hour(s)).      Imaging & Referrals:  None   No orders of the defined types were placed in this encounter.    Collected: 07/24/24 1047 Order Status: CompletedSpecimen: Body fluid,  unspecified from Ureter leftUpdated: 07/24/24 1522 Case Report-- Non-Gynecologic Cytology                          Case: [id number]                                   Authorizing Provider:  Yesenia Goyal MD       Collected:           07/24/2024 10:47 AM          Ordering Location:     [facility]          Received:            07/24/2024 12:06 PM                                 Operating Room                                                               Pathologist:           Mildred Balderrama MD                                                             Specimen:    Ureter left, 1. Left Ureteral Wash                                                     General CategorizationBenign  Final Diagnosis:--   Left ureteral wash; cystoscopy:  Adequacy: Satisfactory but limited by:  Scant cellularity  General Category: Negative for high-grade urothelial carcinoma ([facility])  Diagnosis:  Urothelial cells present  Red blood cells present  Embedded Images-- Final Diagnosis Comment--         Procedure--   Specimen A  Cytospins:     2  Clinical Information--   Left Renal Mass.    Non Gyne Interpretation Negative Gross Description--   Specimen A  Volume (ml): 15  Color: Pink    PROCEDURE: CT ABDOMEN + PELVIS (ALL W+WO) (CPT=74178)      COMPARISON: [facility], CT CHEST (CPT=71250), 5/23/2024, 9:49 AM.  [facility], CT CALCIUM SCORING OVER READ, 4/28/2024, 12:48 PM.  [facility], CT OUTSIDE CD, 5/29/2012, 6:31 PM.      INDICATIONS: Eval for left renal lesion found on previous CT imaging, no complaints.      TECHNIQUE: CT images of the abdomen and pelvis were obtained without and with non-ionic intravenous contrast material.  Automated exposure control for dose reduction was used. Adjustment of the mA and/or kV was done based on the patient's size. Use of   iterative reconstruction technique for dose reduction was used. Dose information is transmitted to the ACR  (American College of Radiology) NRDR (National Radiology Data Registry) which includes the Dose Index Registry.      TECHNIQUE:   CT images of the abdomen and pelvis were obtained without and with non-ionic intravenous contrast material. Automated exposure control for dose reduction was used. Adjustment of the mA and/or kV was done based on the patient's size. Use of   iterative reconstruction technique for dose reduction was used.  Dose information is transmitted to the ACR (American College of Radiology) NRDR (National Radiology Data Registry) which includes the Dose Index Registry. Delayed excretory phase imaging   was also performed. Thin sections and 3-D reconstructions were performed and viewed on an independent workstation.      FINDINGS:      KIDNEYS AND URINARY TRACT:      RIGHT: Absent right kidney.      LEFT: Within the left kidney, there is a peripherally enhancing mass which measures 4 x 3 x 2.8 cm.  The enhancement is seen best on the delayed phase sequences.  There is central areas of diminished enhancement suspicious for necrosis.  Multiple   hyperdense cysts seen throughout the left kidney measuring up to 2.7 cm.  There is no hydronephrosis.  No radiopaque renal calculus.  Within the proximal ureter, there is a soft tissue density seen best on series 14, image 48, series 16, image 38, and   series 17, image 49 with narrowing of the lumen.       BLADDER: No wall thickening, calculus, or mass. No pericystic inflammation.     ADRENALS:   The adrenal glands are unremarkable.   LIVER: There are a few subcentimeter hypodense lesions seen in the liver which are too small to characterize.   GALLBLADDER: The gallbladder is unremarkable.   BILIARY: There is no intra-or extrahepatic biliary duct dilation.   SPLEEN: The spleen is unremarkable.   PANCREAS: Within the pancreatic uncinate process, there is a 1.6 x 1.4 cm cystic area within enhancing mural nodule.  No pancreatic ductal dilation.   AORTA/VASCULAR:    There is atherosclerotic calcification of the abdominal aorta extending into bilateral iliac arteries.   RETROPERITONEUM: There are scattered subcentimeter nonspecific retroperitoneal lymph nodes.   BOWEL/MESENTERY: There is diverticulosis involving the distal descending colon and sigmoid colon without evidence of diverticulitis. There is no dilation or wall thickening. The appendix is normal. There are no inflammatory changes within the right   lower quadrant.    PELVIS: No enlarged mass or adenopathy.     BONES:   There is degenerative disease of the thoracic and lumbar spine. Degenerative changes are seen within the sacroiliac joints and pubic symphysis. Degenerative changes are seen in the bilateral hips. There is grade 1 anterolisthesis of L4 on L5.   LUNG BASES: 0.9 cm nodule within the medial right lower lobe is new since 2012.  Scarring seen within the right lower lobe. There are coronary artery calcifications.                  Impression  CONCLUSION:      4 cm centrally necrotic mass within the left upper pole of the kidney suspicious for a renal cell carcinoma.      Filling defect within the proximal left ureter is indeterminate.  A transitional cell carcinoma is the leading differential and should be excluded.      1.4 cm cystic lesion within the pancreatic uncinate process with an enhancing mural nodule suspicious for cystic pancreatic malignancy.      0.9 cm nodule within the right lower lobe suspicious for pulmonary metastases.         Multiple other incidental findings as described in the body of the report.                Dictated by (CST): Tad Nunez MD on 6/17/2024 at 5:11 PM       Finalized by (CST): Tad Nunez MD on 6/17/2024 at 5:20 PM      Narrative   PROCEDURE: CT CALCIUM SCORING OVER READ      COMPARISON: [place] [address], CT abdomen/pelvis with contrast, 5/29/2012, 6:31 PM.      INDICATIONS: Z13.9 Encounter for screening      TECHNIQUE: CT calcium score was performed.  The raw data  from that study was reprocessed into images of the mediastinum and lung fields in the area that was scanned.  Automated exposure control for dose reduction was used. Adjustment of the mA and/or kV   was done based on the patient's size. Use of iterative reconstruction technique for dose reduction was used.  Dose information is transmitted to the ACR (American College of Radiology) NRDR (National Radiology Data Registry) which includes the Dose Index    Registry.  The CT Calcium Score will be reported separately by the cardiologist.      FINDINGS:   CARDIAC: Coronary artery calcifications.  Heart is not enlarged. Please refer to dedicated calcium scoring CT report for additional cardiac findings.   MEDIASTINUM/LIDYA: No mass or adenopathy.  The entire mediastinum is not imaged on this exam.   LUNGS/PLEURA: Scattered calcified granulomas.  Linear pleural parenchymal bands of atelectasis/scar in the lung bases.  Lobulated 7 x 8 mm right lower lobe nodule (series 4, image 37).  No effusion. Entire lungs are not included in the field-of-view of   this exam.   VASCULATURE: The main pulmonary artery is not enlarged.  The aortic arch is not imaged and the descending thoracic aorta is on the left side.   CHEST WALL: No mass.       LIMITED ABDOMEN: Limited images of the upper abdomen are unremarkable.    BONES: Scattered mild degenerative endplate change within the spine.      OTHER: Negative.                 Impression   CONCLUSION:   1.  7 x 8 mm right lower lobe solid pulmonary nodule.  This nodule was not present on prior outside institution CT abdomen/pelvis from 5/29/12.  Recommend follow-up CT chest to assess for additional nodules.   2.  There is evidence for prior granulomatous disease exposure within the lungs.    3.  Coronary atherosclerosis.  Please refer to dedicated separate report for CT Calcium Score.          Dictated by (CST): Richard Benavides MD on 4/28/2024 at 3:26 PM       Finalized by (CST): Richard Benavides MD  on 4/28/2024 at 3:29 PM        Patient: KUSH HEDRICK   MATT #:  [id number]                    Room: [address] [place] [address].#:  [id number]   Order Number:  [id number]   Exam Description:   MRI Abdomen WWo Contrast   Date of Exam:  05/31/2012      PROCEDURE:  MRI MRCP ABDOMEN WITHOUT AND WITH CONTRAST      COMPARISON: [place] [address], CT ABDOMEN/PELVIS WITH CONTRAST,               5/29/2012, 18:31.      INDICATIONS: Abdominal pain, diarrhea, emesis. Follow up to CT scan.      TECHNIQUE:    Multiplanar multisequence MRI images of the abdomen were                 obtained with and without contrast. MRCP was performed. Post                 contrast images were obtained utilizing bolus timing.      FINDINGS:   Liver has normal size and contour. No intrahepatic biliary ductal   dilatation or hepatic steatosis. In the posterior right hepatic lobe, there   is a T2 bright 10 mm focus, which does not enhance, compatible with a cyst.   Additional smaller T2 bright foci are present in the left hepatic lobe and   caudate, which are too small to definitively characterize, but also likely   represent cysts. Portal and hepatic veins are patent.      MRCP was performed. Gallbladder is present. It has intraluminal high   signal, which likely represents vicarious contrast excretion of iodinated   contrast from recently performed outside hospital CT from 2 days ago. MRCP   shows absence of intrahepatic and extrahepatic biliary ductal dilatation.   Common bile duct measures up to 6 mm, which is normal. It shows normal   smooth tapering towards the ampulla. No evidence of biliary stones,   stricture, or abnormal dilatation; there are T2 bright foci within the   uncinate process of the pancreas described in the next paragraph.      Pancreas has normal intrinsic T1 signal, without abnormal enhancement,   ductal dilatation, atrophy, or pancreatitis. There is a 10 mm T2 bright   focus with a thin internal septation within the uncinate  process of the   pancreas (image #18, series #2). It may represent 2 closely adjacent T2   bright foci  by a thin wall rather than a single septated focus.   It does not appear to communicate with the pancreatic or common bile duct.      Adrenal glands are within normal limits.      There has been previous right nephrectomy or is developmentally absent or   atrophic. No abnormal mass within the right renal fossa. Left kidney shows   probable compensatory hypertrophy with multiple T2 bright foci, compatible   with cysts measuring up to 42 mm in the mid-lower pole cortex. There is   also intrinsically T1 dark nonenhancing 11 mm cortical focus in the lateral   interpolar region, compatible with a hemorrhagic cyst and there is a 9 mm   focus with similar imaging characteristics in the upper pole (image #35 of   series #20) which is too small to characterize but most likely represents a   hemorrhagic cyst. In the upper pole of the left kidney, there is a complex   lobulated focus measuring 42 x 33 x 33 mm (AP x transverse x CC), and it   contains a mural nodule along its medial aspect measuring 7 x 4 mm. The   peripheral margins of this focus shows enhancement as does the neural   nodule. The lumen is intrinsically T2 bright and intrinsically T1 bright,   and does not show enhancement, favoring a hemorrhagic cyst or proteinaceous   cyst. No hydronephrosis.      There is prominent but otherwise nonspecific inflammation in the left   pararenal space which also involves the left paracolic gutter. No organized   drainable fluid collection or lymphadenopathy in the abdomen. Minimal   degenerative change in the lower lumbar spine. There is a 9 mm probable   synovial cyst at the left L4-5 facet, with Tarlov cysts in the sacral   canal.      CONCLUSION:   1. Within the uncinate process the pancreas, there is a 1 cm diameter T2   bright focus, which may or present 2 closely adjacent cystic foci or single   cystic focus  with a thin septation. This finding does not enhance. Based on   MRCP, it does not appear to communicate with the pancreatic or common bile   duct. This may represent a dilated side branch, pseudocyst if there has   been previous pancreatitis, or cystic neoplasm such as IPMN. Followup MRI   imaging in 12 months is recommended.   2. Right kidney is not visualized and may have been removed or is   developmentally absent or atrophic. No abnormal soft tissue in the right   renal fossa. Left kidney appears to have compensatory enlargement and   contains multiple hemorrhagic cysts, simple cysts, and also lobulated 42 mm   diameter complex cyst in the upper pole which has a mural nodule, most   compatible with large hemorrhagic or proteinaceous cyst.   3. Nonspecific inflammation in the left pararenal space and left paracolic   gutter. Correlate for pyelonephritis, recent trauma, or recent procedure.   This could also be a chronic finding.   4. Liver contains T2 bright nonenhancing foci compatible with cysts. Some   of the T2 bright foci are subcentimeter and therefore too small to   characterize, but most likely also represent cysts.   5. Aside from the pancreatic finding, MRCP is unremarkable.   6. Probable synovial cyst in the left L4-5 facet. Sacral Tarlov cysts.   Dictated by (CST):  Richard Benavides MD on 6/01/2012 at 8:12     Approved by   (CST):  Richard Benavides MD on 6/01/2012 at 8:12                                                                Electronically Verified                                             Richard Benavides MD [id number] 06/01/2012 08:12      D:   06/01/2012 8:12 A   T:   06/01/2012  8:12 A   ATTENDING:  Fili Santoyo MD [id number]   ORDERING:  Fili Santoyo      side branch, pseudocyst if there has   been previous pancreatitis, or cystic neoplasm such as IPMN. Followup MRI   imaging in 12 months is recommended.   2. Right kidney is not visualized and may have been removed or is   developmentally absent or atrophic. No abnormal soft tissue in the right   renal fossa. Left kidney appears to have compensatory enlargement and   contains multiple hemorrhagic cysts, simple cysts, and also lobulated 42 mm   diameter complex cyst in the upper pole which has a mural nodule, most   compatible with large hemorrhagic or proteinaceous cyst.   3. Nonspecific inflammation in the left pararenal space and left paracolic   gutter. Correlate for pyelonephritis, recent trauma, or recent procedure.   This could also be a chronic finding.   4. Liver contains T2 bright nonenhancing foci compatible with cysts. Some   of the T2 bright foci are subcentimeter and therefore too small to   characterize, but most likely also represent cysts.   5. Aside from the pancreatic finding, MRCP is unremarkable.   6. Probable synovial cyst in the left L4-5 facet. Sacral Tarlov cysts.   Dictated by (CST):  Richard Benavides MD on 6/01/2012 at 8:12     Approved by   (CST):  Richard Benavides MD on 6/01/2012 at 8:12                                                                Electronically Verified                                             Richard Benavides MD 1835 06/01/2012 08:12      D:   06/01/2012 8:12 A   T:   06/01/2012  8:12 A   ATTENDING:  Fili Santoyo MD [id number]   ORDERING:  Fili Santoyo

## 2024-08-13 ENCOUNTER — HOSPITAL ENCOUNTER (OUTPATIENT)
Dept: MRI IMAGING | Facility: HOSPITAL | Age: 74
Discharge: HOME | End: 2024-08-13
Attending: INTERNAL MEDICINE
Payer: MEDICARE

## 2024-08-13 DIAGNOSIS — K86.2 PANCREATIC CYST (HCC): ICD-10-CM

## 2024-08-13 DIAGNOSIS — N28.89 LEFT RENAL MASS: ICD-10-CM

## 2024-08-13 PROCEDURE — A9575 INJ GADOTERATE MEGLUMI 0.1ML: HCPCS | Performed by: INTERNAL MEDICINE

## 2024-08-13 PROCEDURE — 74183 MRI ABD W/O CNTR FLWD CNTR: CPT | Performed by: INTERNAL MEDICINE

## 2024-08-13 RX ORDER — GADOTERATE MEGLUMINE 376.9 MG/ML
15 INJECTION INTRAVENOUS
Status: COMPLETED | OUTPATIENT
Start: 2024-08-13 | End: 2024-08-13

## 2024-08-13 RX ADMIN — GADOTERATE MEGLUMINE 11 ML: 376.9 INJECTION INTRAVENOUS at 18:13:00

## 2024-08-15 ENCOUNTER — ANESTHESIA (OUTPATIENT)
Dept: ENDOSCOPY | Facility: HOSPITAL | Age: 74
End: 2024-08-15
Payer: MEDICARE

## 2024-08-15 ENCOUNTER — HOSPITAL ENCOUNTER (OUTPATIENT)
Facility: HOSPITAL | Age: 74
Setting detail: HOSPITAL OUTPATIENT SURGERY
Discharge: HOME | End: 2024-08-15
Attending: INTERNAL MEDICINE | Admitting: INTERNAL MEDICINE
Payer: MEDICARE

## 2024-08-15 ENCOUNTER — ANESTHESIA EVENT (OUTPATIENT)
Dept: ENDOSCOPY | Facility: HOSPITAL | Age: 74
End: 2024-08-15
Payer: MEDICARE

## 2024-08-15 VITALS
SYSTOLIC BLOOD PRESSURE: 148 MMHG | OXYGEN SATURATION: 98 % | DIASTOLIC BLOOD PRESSURE: 66 MMHG | WEIGHT: 122 LBS | BODY MASS INDEX: 21.62 KG/M2 | HEART RATE: 60 BPM | RESPIRATION RATE: 21 BRPM | HEIGHT: 63 IN

## 2024-08-15 DIAGNOSIS — K86.2 PANCREAS CYST (HCC): ICD-10-CM

## 2024-08-15 DIAGNOSIS — K90.0 CELIAC DISEASE (HCC): ICD-10-CM

## 2024-08-15 DIAGNOSIS — N28.89 LEFT RENAL MASS: ICD-10-CM

## 2024-08-15 LAB
GLUCOSE BLDC GLUCOMTR-MCNC: 133 MG/DL (ref 70–99)
GLUCOSE BLDC GLUCOMTR-MCNC: 193 MG/DL (ref 70–99)

## 2024-08-15 PROCEDURE — 0FDG8ZX EXTRACTION OF PANCREAS, VIA NATURAL OR ARTIFICIAL OPENING ENDOSCOPIC, DIAGNOSTIC: ICD-10-PCS | Performed by: INTERNAL MEDICINE

## 2024-08-15 PROCEDURE — 88334 PATH CONSLTJ SURG CYTO XM EA: CPT | Performed by: INTERNAL MEDICINE

## 2024-08-15 PROCEDURE — BF47ZZZ ULTRASONOGRAPHY OF PANCREAS: ICD-10-PCS | Performed by: INTERNAL MEDICINE

## 2024-08-15 PROCEDURE — 88333 PATH CONSLTJ SURG CYTO XM 1: CPT | Performed by: INTERNAL MEDICINE

## 2024-08-15 PROCEDURE — 88307 TISSUE EXAM BY PATHOLOGIST: CPT | Performed by: INTERNAL MEDICINE

## 2024-08-15 PROCEDURE — 82962 GLUCOSE BLOOD TEST: CPT

## 2024-08-15 RX ORDER — LABETALOL HYDROCHLORIDE 5 MG/ML
INJECTION, SOLUTION INTRAVENOUS AS NEEDED
Status: DISCONTINUED | OUTPATIENT
Start: 2024-08-15 | End: 2024-08-15 | Stop reason: SURG

## 2024-08-15 RX ORDER — SODIUM CHLORIDE, SODIUM LACTATE, POTASSIUM CHLORIDE, CALCIUM CHLORIDE 600; 310; 30; 20 MG/100ML; MG/100ML; MG/100ML; MG/100ML
INJECTION, SOLUTION INTRAVENOUS CONTINUOUS
Status: DISCONTINUED | OUTPATIENT
Start: 2024-08-15 | End: 2024-08-15

## 2024-08-15 RX ORDER — LEVOFLOXACIN 5 MG/ML
500 INJECTION, SOLUTION INTRAVENOUS ONCE
Status: COMPLETED | OUTPATIENT
Start: 2024-08-15 | End: 2024-08-15

## 2024-08-15 RX ORDER — LIDOCAINE HYDROCHLORIDE 10 MG/ML
INJECTION, SOLUTION EPIDURAL; INFILTRATION; INTRACAUDAL; PERINEURAL AS NEEDED
Status: DISCONTINUED | OUTPATIENT
Start: 2024-08-15 | End: 2024-08-15 | Stop reason: SURG

## 2024-08-15 RX ORDER — NALOXONE HYDROCHLORIDE 0.4 MG/ML
0.08 INJECTION, SOLUTION INTRAMUSCULAR; INTRAVENOUS; SUBCUTANEOUS ONCE AS NEEDED
Status: DISCONTINUED | OUTPATIENT
Start: 2024-08-15 | End: 2024-08-15

## 2024-08-15 RX ADMIN — LABETALOL HYDROCHLORIDE 5 MG: 5 INJECTION, SOLUTION INTRAVENOUS at 14:17:00

## 2024-08-15 RX ADMIN — LIDOCAINE HYDROCHLORIDE 70 MG: 10 INJECTION, SOLUTION EPIDURAL; INFILTRATION; INTRACAUDAL; PERINEURAL at 14:06:00

## 2024-08-15 RX ADMIN — LABETALOL HYDROCHLORIDE 5 MG: 5 INJECTION, SOLUTION INTRAVENOUS at 14:26:00

## 2024-08-15 RX ADMIN — SODIUM CHLORIDE, SODIUM LACTATE, POTASSIUM CHLORIDE, CALCIUM CHLORIDE: 600; 310; 30; 20 INJECTION, SOLUTION INTRAVENOUS at 14:45:00

## 2024-08-15 RX ADMIN — LEVOFLOXACIN 500 MG: 5 INJECTION, SOLUTION INTRAVENOUS at 14:18:00

## 2024-08-15 NOTE — ANESTHESIA POSTPROCEDURE EVALUATION
Patient: Zenia Mcduffie    Procedure Summary       Date: 08/15/24 Room / Location: [facility] ENDOSCOPY 01 / [facility] ENDOSCOPY    Anesthesia Start: 1403 Anesthesia Stop:     Procedure: ENDOSCOPIC ULTRASOUND WITH FINE NEEDLE ASPIRATION Diagnosis:       Pancreas cyst (HCC)      Left renal mass      Celiac disease (HCC)      (Celiac disease (HCC)/Left renal mass/Pancreas cyst (HCC))    Surgeons: Chucky Nunez MD Anesthesiologist: Gaby Gilbert CRNA    Anesthesia Type: MAC, general ASA Status: 2            Anesthesia Type: MAC, general    Vitals Value Taken Time   /63 08/15/24 1448   Temp 97 08/15/24 1448   Pulse 68 08/15/24 1448   Resp 17 08/15/24 1448   SpO2 97 08/15/24 1448       [facility] AN Post Evaluation:   Patient Evaluated in PACU  Patient Participation: waiting for patient participation  Level of Consciousness: sleepy but conscious  Pain Management: adequate  Airway Patency:patent  Dental exam unchanged from preop  Yes    Nausea/Vomiting: none  Cardiovascular Status: acceptable and hemodynamically stable  Respiratory Status: acceptable, room air and spontaneous ventilation  Postoperative Hydration acceptable      Gaby Gilbert CRNA  8/15/2024 2:48 PM

## 2024-08-15 NOTE — OPERATIVE REPORT
[facility] OPERATIVE REPORT   PATIENT NAME: Zenia Mcduffie  MRN: [id number]  DATE OF OPERATION: 8/15/2024  PREOPERATIVE DIAGNOSIS: abnormal CT and MRI c/w pancreatic cyst with mural nodule in head of pancreas; renal mass  POSTOPERATIVE DIAGNOSIS:    1.  1.3cm mural nodule (villous appearing mass) within a 1.5cm cyst in head of pancreas s/p FNB and FNA with forceps biopsies   2.  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 The patient was identified by myself and nursing staff in the exam room. Informed consent was obtained. The patient was seen in clinic and a full H&P was obtained. On brief physical examination, airway is patent. Chest is clear. Heart has regular rate and rhythm. Abdomen is soft, nontender with good bowel sounds. A medication list was taken by nursing today and reviewed by myself. The patient is an ASA grade 2.   PROCEDURE NOTE: The procedure was completed without difficulty. The patient tolerated the procedure well.   Upper endoscopy (EGD):  The endoscope was inserted through the mouth and advanced to the level of the duodenum, 3rd portion.  Visualized portion of the esophagus, stomach including antrum, body, fundus and cardiac, and duodenum were normal.  Endoscopic ultrasound (EUS):  Endoscopic ultrasound was performed using the linear echoendoscope.  Images were obtained.    LIVER: Left lobe of the liver was visualized and no mass or lesions seen.  No intrahepatic duct dilation was noted.  Portal vein was noted to come out of the liver and the portal confluence was seen and pancreas was noted.   PANCREAS:  Pancreatic neck, body, and tail were interrogated from the gastric body while the neck,  head and uncinate were examined from the 1st and 2nd duodenum.  PD- dilated throughout   - neck: 3.7 mm  - head: 4.5 mm  Pancreas divisum: no  Chronic pancreatitis changes: no  Neoplasm: possible.  Cystic lesion in head of pancreas contained a large villous appearing mass that was c/w mural nodule. Using color flow doppler to make sure there no intervening vessels, a 22G FNB needle was used to pucnture into the lesion. 2 passes were made.  Additionally 19G FNA needle was used to puncture the cyst and a Moray forceps biopsy was used to sample the tissue. Adequate tissue was obtained for cytology.  No other cystic lesions were noted.  Biliary Tree: normal throghout  - stones:  no  GALLBLADDER: not visualized   CELIAC AXIS:  visualized without lymphadenopathy  L ADRENAL GLAND:  visualized   L KIDNEY:  visualized   MEDIASTINUM:  visualized without lymphadenopathy  Scope was withdrawn from the patient and patient tolerated the procedure well.  FINDINGS   Pancreatic head cyst with mural nodule   RECOMMENDATIONS: will await results of biopsies  DISCHARGE:  On discharge, the patient was given an after-visit summary detailing the procedure, findings, followup plans, and an updated medication list.     Thank you very much for the consultation.  I really appreciate it.    Chucky Nunez MD

## 2024-08-15 NOTE — DISCHARGE INSTRUCTIONS
Home Care Instructions for Gastroscopy with Sedation    Diet:  - Resume your regular diet as tolerated unless otherwise instructed.  - Start with light meals to minimize bloating.  - Do not drink alcohol today.    Medication:  - If you have questions about resuming your normal medications, please contact your Primary Care Physician.    Activities:  - Take it easy today. Do not return to work today.  - Do not drive today.  - Do not operate any machinery today (including kitchen equipment).    Gastroscopy:  - You may have a sore throat for 2-3 days following the exam. This is normal. Gargling with warm salt water (1/2 tsp salt to 1 glass warm water) or using throat lozenges will help.  - If you experience any sharp pain in your neck, abdomen or chest, vomiting of blood, oral temperature over 100 degrees Fahrenheit, light-headedness or dizziness, or any other problems, contact your doctor.    **If unable to reach your doctor, please go to the [facility] Emergency Room**    - Your referring physician will receive a full report of your examination.  - If you do not hear from your doctor's office within two weeks of your biopsy, please call them for your results.    You may be able to see your laboratory results in [employer] between 4 and 7 business days.  In some cases, your physician may not have viewed the results before they are released to [employer].  If you have questions regarding your results contact the physician who ordered the test/exam by phone or via [employer] by choosing \"Ask a Medical Question.\"

## 2024-08-15 NOTE — ANESTHESIA PREPROCEDURE EVALUATION
Anesthesia PreOp Note    HPI:     Zenia Mcduffie is a 74 year old female who presents for preoperative consultation requested by: Chucky Nunez MD    Date of Surgery: 8/15/2024    Procedure(s):  ENDOSCOPIC ULTRASOUND WITH FINE NEEDLE ASPIRATION  Indication: Celiac disease (HCC)/Left renal mass/Pancreas cyst (HCC)    Relevant Problems   No relevant active problems       NPO:  Last Liquid Consumption Date: 08/15/24  Last Liquid Consumption Time: 820  Last Solid Consumption Date: 24  Last Solid Consumption Time:   Last Liquid Consumption Date: 08/15/24          History Review:  Patient Active Problem List    Diagnosis Date Noted    Type 1 diabetes mellitus (HCC)     Essential hypertension     Celiac disease (HCC)     Hypothyroid     Hyperlipidemia        Past Medical History:    Basal cell carcinoma    Cancer of kidney (HCC)    Celiac disease (HCC)    Disorder of thyroid    Essential hypertension    Hearing decreased, bilateral    bilat hearing aides    High blood pressure    High cholesterol    Hyperchloremia    Hyperlipidemia    Hypothyroid    Microscopic colitis    PONV (postoperative nausea and vomiting)    Solitary kidney    Type 1 diabetes mellitus (HCC)    sees Dr. Tinajero, with retinopathy       Past Surgical History:   Procedure Laterality Date          Colonoscopy      Needle biopsy left Left         Stapedectomy         Medications Prior to Admission   Medication Sig Dispense Refill Last Dose    LORazepam 0.5 MG Oral Tab Take one 60 minutes prior to MRI and may have second dose if needed 30 minutes prior to MRI.  Will need a . 2 tablet 0     Finerenone (KERENDIA) 10 MG Oral Tab Take 10 mg by mouth daily.       atorvastatin 40 MG Oral Tab Take 1 tablet (40 mg total) by mouth nightly.       Turmeric 500 MG Oral Cap Take 1,000 mg by mouth daily.       MAGNESIUM OXIDE 400 MG Oral Tab TAKE 1 TABLET BY MOUTH EVERY DAY 90 tablet 1     FOLBEE 2.5-25-1 MG Oral Tab TAKE 1  TABLET BY MOUTH EVERY DAY 90 tablet 1     OLMESARTAN MEDOXOMIL 40 MG Oral Tab TAKE 1 TABLET BY MOUTH EVERY DAY (Patient taking differently: Take 1 tablet (40 mg total) by mouth every morning.) 90 tablet 1     LEVOTHYROXINE 75 MCG Oral Tab TAKE 1 TABLET BY MOUTH EVERY DAY 90 tablet 1     OMEGA-3-ACID ETHYL ESTERS 1 g Oral Cap TAKE 4 CAPSULES (4 G TOTAL) BY MOUTH DAILY. (Patient taking differently: Take 2 capsules (2 g total) by mouth 2 (two) times daily.) 360 capsule 1     Insulin Degludec (TRESIBA) 100 UNIT/ML Subcutaneous Solution Inject 11 Units into the skin every evening.  0     Insulin Aspart (NOVOLOG FLEXPEN SC) Inject into the skin. Sliding scale       Cholecalciferol (HM VITAMIN D3) 4000 units Oral Cap Take 1 capsule by mouth every 7 days.       Vitamin B-12 1000 MCG Oral Tab Take 1 tablet (1,000 mcg total) by mouth daily.       Biotin (BIOTIN 5000) 5 MG Oral Cap Take 5,000 mg by mouth daily.       Multiple Vitamins-Minerals (MULTI FOR HER) Oral Cap Take 1 capsule by mouth.       traMADol 50 MG Oral Tab Take 1 tablet (50 mg total) by mouth every 4 (four) hours as needed for Pain. (Patient not taking: Reported on 7/30/2024) 10 tablet 0     phenazopyridine 100 MG Oral Tab Take 1 tablet (100 mg total) by mouth 3 (three) times daily as needed for Pain (burning with urination). (Patient not taking: Reported on 8/10/2024) 30 tablet 0 Not Taking    HYDROCHLOROTHIAZIDE 25 MG Oral Tab TAKE 1 TABLET BY MOUTH EVERY DAY (Patient not taking: Reported on 8/10/2024) 90 tablet 1 Not Taking    Insulin Pen Needle (BD PEN NEEDLE MINI U/F) 31G X 5 MM Does not apply Misc USE 4 TIMES DAILY.. Insulin Dependent Diabetes Type II. Z79.4, E11.9. 400 each 3     Calcium Citrate-Vitamin D (CITRACAL + D OR) Take 1 capsule by mouth 2 (two) times daily. (Patient not taking: Reported on 7/30/2024)       ACCU-CHEK COMPACT PLUS In Vitro Strip by Finger stick route As Directed. Use 6-8 times daily  1      Current Facility-Administered  Medications Ordered in Epic   Medication Dose Route Frequency Provider Last Rate Last Admin    levoFLOXacin in dextrose 5% (Levaquin) 500 mg/100mL IVPB premix 500 mg  500 mg Intravenous Once Chucky Nunze MD        lactated ringers infusion   Intravenous Continuous Chucky Nunez MD         No current [facility]-ordered outpatient medications on file.       Allergies   Allergen Reactions    Gluten Flour UNKNOWN    Seasonal OTHER (SEE COMMENTS)       Family History   Problem Relation Age of Onset    Ovarian Cancer Maternal Grandmother         70s    Breast Cancer Neg      Social History     Socioeconomic History    Marital status:    Tobacco Use    Smoking status: Never    Smokeless tobacco: Never   Vaping Use    Vaping status: Never Used   Substance and Sexual Activity    Alcohol use: Not Currently     Comment: very rare    Drug use: Never       Available pre-op labs reviewed.  Lab Results   Component Value Date    WBC 7.4 07/18/2024    RBC 3.50 (L) 07/18/2024    HGB 11.0 (L) 07/18/2024    HCT 31.9 (L) 07/18/2024    MCV 91.1 07/18/2024    MCH 31.4 07/18/2024    MCHC 34.5 07/18/2024    RDW 13.2 07/18/2024    .0 07/18/2024     Lab Results   Component Value Date     (L) 07/18/2024    K 4.7 07/18/2024     07/18/2024    CO2 25.0 07/18/2024    BUN 29 (H) 07/18/2024    CREATSERUM 1.03 (H) 07/18/2024     (H) 07/18/2024    PGLU 182 (H) 07/24/2024    CA 9.6 07/18/2024          Vital Signs:  Body mass index is 21.61 kg/m².   height is 1.6 m (5' 3\") and weight is 55.3 kg (122 lb).   Vitals:    08/10/24 0921 08/15/24 1221   Weight: 55.3 kg (122 lb) 55.3 kg (122 lb)   Height: 1.6 m (5' 3\") 1.6 m (5' 3\")        Anesthesia Evaluation     Patient summary reviewed and Nursing notes reviewed    History of anesthetic complications   Airway   Mallampati: II  TM distance: >3 FB  Neck ROM: full  Dental - Dentition appears grossly intact     Pulmonary - negative ROS and normal exam   Cardiovascular -  normal exam  (+) hypertension well controlled    Neuro/Psych - negative ROS     GI/Hepatic/Renal - negative ROS     Endo/Other    (+) diabetes mellitus type 1 well controlled using insulin, hypothyroidism  Abdominal  - normal exam                 Anesthesia Plan:   ASA:  2  Plan:   MAC and general      I have informed Zenia JIMENEZ Alaimo and/or legal guardian or family member of the nature of the anesthetic plan, benefits, risks including possible dental damage if relevant, major complications, and any alternative forms of anesthetic management.   All of the patient's questions were answered to the best of my ability. The patient desires the anesthetic management as planned.  Gaby Gilbert CRNA  8/15/2024 12:30 PM  Present on Admission:  **None**

## 2024-08-15 NOTE — H&P
History & Physical Examination    Patient Name: Zenia Mcduffie  MRN: [id number]  CSN: [id number]  YOB: 1950    Diagnosis: Celiac disease (HCC)/Left renal mass/Pancreas cyst (HCC)       Present Illness:  Zenia Mcduffie is a 74 year old female is here Celiac disease (HCC)/Left renal mass/Pancreas cyst (HCC).    Body mass index is 21.61 kg/m².    Past Medical History:    Basal cell carcinoma    Cancer of kidney (HCC)    Celiac disease (HCC)    Disorder of thyroid    Essential hypertension    Hearing decreased, bilateral    bilat hearing aides    High blood pressure    High cholesterol    Hyperchloremia    Hyperlipidemia    Hypothyroid    Microscopic colitis    PONV (postoperative nausea and vomiting)    Solitary kidney    Type 1 diabetes mellitus (HCC)    sees Dr. Tinajero, with retinopathy       Procedure: EUS    Physician Pre-Sedation Assessment    Pre-Sedation Assessment:    Sedation History: Airway Assessed    ASA Classification: 2. Patient with mild systemic disease    Cardiac:    Respiratory:    Abdomen:      Plan: MAC        Current Facility-Administered Medications   Medication Dose Route Frequency    lactated ringers infusion   Intravenous Continuous    lactated ringers infusion   Intravenous Continuous    naloxone (Narcan) 0.4 MG/ML injection 0.08 mg  0.08 mg Intravenous Once PRN       Allergies:   Allergies   Allergen Reactions    Gluten Flour UNKNOWN    Seasonal OTHER (SEE COMMENTS)       Past Surgical History:   Procedure Laterality Date          Colonoscopy      Needle biopsy left Left         Stapedectomy       Family History   Problem Relation Age of Onset    Ovarian Cancer Maternal Grandmother         70s    Breast Cancer Neg      Social History     Tobacco Use    Smoking status: Never    Smokeless tobacco: Never   Substance Use Topics    Alcohol use: Not Currently     Comment: very rare       SYSTEM Check if Review is Normal Check if Physical Exam is Normal If not  normal, please explain:   HEENT [x ] [x ]    NECK & BACK [x ] [x ]    HEART [x ] [x ]    LUNGS [x ] [x ]    ABDOMEN [x ] [x ]    UROGENITAL [ ] [ ]    EXTREMITIES [ ] [ ]    OTHER        [ x ] I have discussed the risks and benefits and alternatives with the patient/family.  They understand and agree to proceed with plan of care.  [ x ] I have reviewed the History and Physical done within the last 30 days.  Any changes noted above.    Chucky Nunez MD  8/15/2024  3:39 PM

## 2024-09-14 ENCOUNTER — LAB ENCOUNTER (OUTPATIENT)
Dept: LAB | Facility: HOSPITAL | Age: 74
End: 2024-09-14
Attending: INTERNAL MEDICINE
Payer: MEDICARE

## 2024-09-14 DIAGNOSIS — E10.9 DIABETES MELLITUS TYPE I (HCC): ICD-10-CM

## 2024-09-14 DIAGNOSIS — E78.2 MIXED HYPERLIPIDEMIA: ICD-10-CM

## 2024-09-14 DIAGNOSIS — I51.9 MYXEDEMA HEART DISEASE: Primary | ICD-10-CM

## 2024-09-14 DIAGNOSIS — E03.9 MYXEDEMA HEART DISEASE: Primary | ICD-10-CM

## 2024-09-14 LAB
ALBUMIN SERPL-MCNC: 3.9 G/DL (ref 3.2–4.8)
ALBUMIN/GLOB SERPL: 1.8 {RATIO} (ref 1–2)
ALP LIVER SERPL-CCNC: 49 U/L
ALT SERPL-CCNC: 36 U/L
ANION GAP SERPL CALC-SCNC: 5 MMOL/L (ref 0–18)
AST SERPL-CCNC: 33 U/L (ref ?–34)
BILIRUB SERPL-MCNC: 0.5 MG/DL (ref 0.2–1.1)
BUN BLD-MCNC: 29 MG/DL (ref 9–23)
BUN/CREAT SERPL: 31.9 (ref 10–20)
CALCIUM BLD-MCNC: 9.7 MG/DL (ref 8.7–10.4)
CHLORIDE SERPL-SCNC: 104 MMOL/L (ref 98–112)
CHOLEST SERPL-MCNC: 151 MG/DL (ref ?–200)
CO2 SERPL-SCNC: 27 MMOL/L (ref 21–32)
CREAT BLD-MCNC: 0.91 MG/DL
EGFRCR SERPLBLD CKD-EPI 2021: 65 ML/MIN/1.73M2 (ref 60–?)
EST. AVERAGE GLUCOSE BLD GHB EST-MCNC: 169 MG/DL (ref 68–126)
FASTING PATIENT LIPID ANSWER: YES
FASTING STATUS PATIENT QL REPORTED: YES
GLOBULIN PLAS-MCNC: 2.2 G/DL (ref 2–3.5)
GLUCOSE BLD-MCNC: 94 MG/DL (ref 70–99)
HBA1C MFR BLD: 7.5 % (ref ?–5.7)
HDLC SERPL-MCNC: 63 MG/DL (ref 40–59)
LDLC SERPL CALC-MCNC: 80 MG/DL (ref ?–100)
NONHDLC SERPL-MCNC: 88 MG/DL (ref ?–130)
OSMOLALITY SERPL CALC.SUM OF ELEC: 288 MOSM/KG (ref 275–295)
POTASSIUM SERPL-SCNC: 4.6 MMOL/L (ref 3.5–5.1)
PROT SERPL-MCNC: 6.1 G/DL (ref 5.7–8.2)
SODIUM SERPL-SCNC: 136 MMOL/L (ref 136–145)
T4 FREE SERPL-MCNC: 1.4 NG/DL (ref 0.8–1.7)
TRIGL SERPL-MCNC: 31 MG/DL (ref 30–149)
TSI SER-ACNC: 2.01 MIU/ML (ref 0.55–4.78)
VLDLC SERPL CALC-MCNC: 5 MG/DL (ref 0–30)

## 2024-09-14 PROCEDURE — 80053 COMPREHEN METABOLIC PANEL: CPT

## 2024-09-14 PROCEDURE — 80061 LIPID PANEL: CPT

## 2024-09-14 PROCEDURE — 83036 HEMOGLOBIN GLYCOSYLATED A1C: CPT

## 2024-09-14 PROCEDURE — 84443 ASSAY THYROID STIM HORMONE: CPT

## 2024-09-14 PROCEDURE — 84439 ASSAY OF FREE THYROXINE: CPT

## 2024-09-14 PROCEDURE — 36415 COLL VENOUS BLD VENIPUNCTURE: CPT

## 2024-10-04 ENCOUNTER — HOSPITAL ENCOUNTER (OUTPATIENT)
Dept: CT IMAGING | Facility: HOSPITAL | Age: 74
Discharge: HOME | End: 2024-10-04
Attending: INTERNAL MEDICINE
Payer: MEDICARE

## 2024-10-04 DIAGNOSIS — R91.1 PULMONARY NODULE: ICD-10-CM

## 2024-10-04 PROCEDURE — 71250 CT THORAX DX C-: CPT | Performed by: INTERNAL MEDICINE

## 2024-12-02 DIAGNOSIS — F41.9 ANXIETY DUE TO INVASIVE PROCEDURE: ICD-10-CM

## 2024-12-02 RX ORDER — LORAZEPAM 0.5 MG/1
TABLET ORAL
Qty: 2 TABLET | Refills: 0 | Status: SHIPPED | OUTPATIENT
Start: 2024-12-02 | End: 2025-03-04

## 2024-12-16 ENCOUNTER — HOSPITAL ENCOUNTER (OUTPATIENT)
Dept: MRI IMAGING | Facility: HOSPITAL | Age: 74
Discharge: HOME | End: 2024-12-16
Attending: INTERNAL MEDICINE
Payer: MEDICARE

## 2024-12-16 DIAGNOSIS — K86.2 PANCREAS CYST (HCC): ICD-10-CM

## 2024-12-16 PROCEDURE — 76376 3D RENDER W/INTRP POSTPROCES: CPT | Performed by: INTERNAL MEDICINE

## 2024-12-16 PROCEDURE — 74183 MRI ABD W/O CNTR FLWD CNTR: CPT | Performed by: INTERNAL MEDICINE

## 2024-12-16 PROCEDURE — A9575 INJ GADOTERATE MEGLUMI 0.1ML: HCPCS | Performed by: INTERNAL MEDICINE

## 2024-12-16 RX ORDER — GADOTERATE MEGLUMINE 376.9 MG/ML
15 INJECTION INTRAVENOUS
Status: COMPLETED | OUTPATIENT
Start: 2024-12-16 | End: 2024-12-16

## 2024-12-16 RX ADMIN — GADOTERATE MEGLUMINE 11 ML: 376.9 INJECTION INTRAVENOUS at 07:37:00

## 2024-12-26 ENCOUNTER — TELEPHONE (OUTPATIENT)
Dept: SURGERY | Facility: CLINIC | Age: 74
End: 2024-12-26

## 2024-12-26 NOTE — TELEPHONE ENCOUNTER
Spoke to patient's spouse regarding update on cancelled appointment. Dr. Castillo confirms patient is okay to continue follow-ups with Dr. Nunez. No further follow-ups needed with Dr. Castillo at this time. Patient scheduled with Dr. Nunez on 2/27/25. All questions answered, advised to call back for any future questions or concerns. Patient's  verbalized understanding.

## 2025-03-01 ENCOUNTER — LAB ENCOUNTER (OUTPATIENT)
Dept: LAB | Facility: HOSPITAL | Age: 75
End: 2025-03-01
Attending: INTERNAL MEDICINE
Payer: MEDICARE

## 2025-03-01 DIAGNOSIS — E10.9 DIABETES MELLITUS TYPE I (HCC): Primary | ICD-10-CM

## 2025-03-01 DIAGNOSIS — I51.9 MYXEDEMA HEART DISEASE: ICD-10-CM

## 2025-03-01 DIAGNOSIS — E78.2 MIXED HYPERLIPIDEMIA: ICD-10-CM

## 2025-03-01 DIAGNOSIS — E03.9 MYXEDEMA HEART DISEASE: ICD-10-CM

## 2025-03-01 DIAGNOSIS — E55.9 VITAMIN D DEFICIENCY: ICD-10-CM

## 2025-03-01 LAB
ALBUMIN SERPL-MCNC: 4.3 G/DL (ref 3.2–4.8)
ALBUMIN/GLOB SERPL: 2.4 {RATIO} (ref 1–2)
ALP LIVER SERPL-CCNC: 49 U/L
ALT SERPL-CCNC: 33 U/L
ANION GAP SERPL CALC-SCNC: 3 MMOL/L (ref 0–18)
AST SERPL-CCNC: 34 U/L (ref ?–34)
BILIRUB SERPL-MCNC: 0.4 MG/DL (ref 0.2–1.1)
BUN BLD-MCNC: 32 MG/DL (ref 9–23)
BUN/CREAT SERPL: 29.6 (ref 10–20)
CALCIUM BLD-MCNC: 9.5 MG/DL (ref 8.7–10.4)
CHLORIDE SERPL-SCNC: 106 MMOL/L (ref 98–112)
CHOLEST SERPL-MCNC: 145 MG/DL (ref ?–200)
CO2 SERPL-SCNC: 27 MMOL/L (ref 21–32)
CREAT BLD-MCNC: 1.08 MG/DL
CREAT UR-SCNC: 34.8 MG/DL
EGFRCR SERPLBLD CKD-EPI 2021: 53 ML/MIN/1.73M2 (ref 60–?)
EST. AVERAGE GLUCOSE BLD GHB EST-MCNC: 166 MG/DL (ref 68–126)
FASTING PATIENT LIPID ANSWER: YES
FASTING STATUS PATIENT QL REPORTED: YES
GLOBULIN PLAS-MCNC: 1.8 G/DL (ref 2–3.5)
GLUCOSE BLD-MCNC: 114 MG/DL (ref 70–99)
HBA1C MFR BLD: 7.4 % (ref ?–5.7)
HDLC SERPL-MCNC: 66 MG/DL (ref 40–59)
LDLC SERPL CALC-MCNC: 68 MG/DL (ref ?–100)
MICROALBUMIN UR-MCNC: 3.4 MG/DL
MICROALBUMIN/CREAT 24H UR-RTO: 97.7 UG/MG (ref ?–30)
NONHDLC SERPL-MCNC: 79 MG/DL (ref ?–130)
OSMOLALITY SERPL CALC.SUM OF ELEC: 290 MOSM/KG (ref 275–295)
POTASSIUM SERPL-SCNC: 4.6 MMOL/L (ref 3.5–5.1)
PROT SERPL-MCNC: 6.1 G/DL (ref 5.7–8.2)
SODIUM SERPL-SCNC: 136 MMOL/L (ref 136–145)
T4 FREE SERPL-MCNC: 1.4 NG/DL (ref 0.8–1.7)
TRIGL SERPL-MCNC: 47 MG/DL (ref 30–149)
TSI SER-ACNC: 1.41 UIU/ML (ref 0.55–4.78)
VIT D+METAB SERPL-MCNC: 51.1 NG/ML (ref 30–100)
VLDLC SERPL CALC-MCNC: 7 MG/DL (ref 0–30)

## 2025-03-01 PROCEDURE — 84443 ASSAY THYROID STIM HORMONE: CPT

## 2025-03-01 PROCEDURE — 80053 COMPREHEN METABOLIC PANEL: CPT

## 2025-03-01 PROCEDURE — 82043 UR ALBUMIN QUANTITATIVE: CPT

## 2025-03-01 PROCEDURE — 82306 VITAMIN D 25 HYDROXY: CPT

## 2025-03-01 PROCEDURE — 83036 HEMOGLOBIN GLYCOSYLATED A1C: CPT

## 2025-03-01 PROCEDURE — 82570 ASSAY OF URINE CREATININE: CPT

## 2025-03-01 PROCEDURE — 80061 LIPID PANEL: CPT

## 2025-03-01 PROCEDURE — 36415 COLL VENOUS BLD VENIPUNCTURE: CPT

## 2025-03-01 PROCEDURE — 84439 ASSAY OF FREE THYROXINE: CPT

## 2025-03-04 RX ORDER — AMLODIPINE BESYLATE 5 MG/1
5 TABLET ORAL NIGHTLY
COMMUNITY

## 2025-03-04 RX ORDER — INSULIN LISPRO 100 [IU]/ML
INJECTION, SOLUTION INTRAVENOUS; SUBCUTANEOUS
COMMUNITY

## 2025-03-06 ENCOUNTER — ANESTHESIA (OUTPATIENT)
Dept: ENDOSCOPY | Facility: HOSPITAL | Age: 75
End: 2025-03-06
Payer: MEDICARE

## 2025-03-06 ENCOUNTER — HOSPITAL ENCOUNTER (OUTPATIENT)
Facility: HOSPITAL | Age: 75
Setting detail: HOSPITAL OUTPATIENT SURGERY
Discharge: HOME | End: 2025-03-06
Attending: INTERNAL MEDICINE | Admitting: INTERNAL MEDICINE
Payer: MEDICARE

## 2025-03-06 ENCOUNTER — ANESTHESIA EVENT (OUTPATIENT)
Dept: ENDOSCOPY | Facility: HOSPITAL | Age: 75
End: 2025-03-06
Payer: MEDICARE

## 2025-03-06 LAB — GLUCOSE BLDC GLUCOMTR-MCNC: 203 MG/DL (ref 70–99)

## 2025-03-06 PROCEDURE — BD47ZZZ ULTRASONOGRAPHY OF GASTROINTESTINAL TRACT: ICD-10-PCS | Performed by: INTERNAL MEDICINE

## 2025-03-06 PROCEDURE — 82962 GLUCOSE BLOOD TEST: CPT

## 2025-03-06 RX ORDER — LIDOCAINE HYDROCHLORIDE 20 MG/ML
INJECTION, SOLUTION EPIDURAL; INFILTRATION; INTRACAUDAL; PERINEURAL AS NEEDED
Status: DISCONTINUED | OUTPATIENT
Start: 2025-03-06 | End: 2025-03-06 | Stop reason: SURG

## 2025-03-06 RX ORDER — SODIUM CHLORIDE, SODIUM LACTATE, POTASSIUM CHLORIDE, CALCIUM CHLORIDE 600; 310; 30; 20 MG/100ML; MG/100ML; MG/100ML; MG/100ML
INJECTION, SOLUTION INTRAVENOUS CONTINUOUS
Status: DISCONTINUED | OUTPATIENT
Start: 2025-03-06 | End: 2025-03-06

## 2025-03-06 RX ORDER — LEVOFLOXACIN 5 MG/ML
500 INJECTION, SOLUTION INTRAVENOUS ONCE
Status: DISCONTINUED | OUTPATIENT
Start: 2025-03-06 | End: 2025-03-06

## 2025-03-06 RX ADMIN — LIDOCAINE HYDROCHLORIDE 50 MG: 20 INJECTION, SOLUTION EPIDURAL; INFILTRATION; INTRACAUDAL; PERINEURAL at 07:58:00

## 2025-03-06 RX ADMIN — SODIUM CHLORIDE, SODIUM LACTATE, POTASSIUM CHLORIDE, CALCIUM CHLORIDE: 600; 310; 30; 20 INJECTION, SOLUTION INTRAVENOUS at 07:55:00

## 2025-03-06 NOTE — ANESTHESIA PREPROCEDURE EVALUATION
Anesthesia PreOp Note    HPI:     Zenia Mcduffie is a 75 year old female who presents for preoperative consultation requested by: Chucky Nunez MD    Date of Surgery: 3/6/2025    Procedure(s):  ENDOSCOPIC ULTRASOUND WITH FINE NEEDLE ASPIRATION  Indication: Pancreas cyst (HCC)    Relevant Problems   No relevant active problems       NPO:  Last Liquid Consumption Date: 25  Last Liquid Consumption Time: 515  Last Solid Consumption Date: 25  Last Solid Consumption Time:   Last Liquid Consumption Date: 25          History Review:  Patient Active Problem List    Diagnosis Date Noted    Type 1 diabetes mellitus (HCC)     Essential hypertension     Celiac disease (HCC)     Hypothyroid     Hyperlipidemia        Past Medical History:    Basal cell carcinoma    Celiac disease (HCC)    Disorder of thyroid    Essential hypertension    Hearing decreased, bilateral    bilat hearing aides    Hearing impaired person, bilateral    HEARING AIDS    High blood pressure    High cholesterol    Hyperchloremia    Hyperlipidemia    Hypothyroid    Microscopic colitis    PONV (postoperative nausea and vomiting)    Solitary kidney    Solitary kidney, congenital    Type 1 diabetes mellitus (HCC)    sees Dr. Tinajero, with retinopathy       Past Surgical History:   Procedure Laterality Date          Colonoscopy      Needle biopsy left Left         Stapedectomy         Prescriptions Prior to Admission[1]  Current Medications and Prescriptions Ordered in Epic[2]    Allergies[3]    Family History   Problem Relation Age of Onset    Ovarian Cancer Maternal Grandmother         70s    Breast Cancer Neg      Social History     Socioeconomic History    Marital status:    Tobacco Use    Smoking status: Never    Smokeless tobacco: Never   Vaping Use    Vaping status: Never Used   Substance and Sexual Activity    Alcohol use: Not Currently     Comment: very rare    Drug use: Never       Available pre-op  labs reviewed.     Lab Results   Component Value Date     03/01/2025    K 4.6 03/01/2025     03/01/2025    CO2 27.0 03/01/2025    BUN 32 (H) 03/01/2025    CREATSERUM 1.08 (H) 03/01/2025     (H) 03/01/2025    PGLU 203 (H) 03/06/2025    CA 9.5 03/01/2025          Vital Signs:  Body mass index is 22.14 kg/m².   height is 1.6 m (5' 3\") and weight is 56.7 kg (125 lb). Her blood pressure is 115/91 (abnormal) and her pulse is 69. Her respiration is 14 and oxygen saturation is 98%.   Vitals:    03/04/25 0934 03/06/25 0703   BP:  (!) 115/91   Pulse:  69   Resp:  14   SpO2:  98%   Weight: 56.7 kg (125 lb)    Height: 1.6 m (5' 3\")         Anesthesia Evaluation     Patient summary reviewed and Nursing notes reviewed    History of anesthetic complications   Airway   Mallampati: II  TM distance: >3 FB  Neck ROM: full  Dental - Dentition appears grossly intact     Pulmonary - negative ROS and normal exam   Cardiovascular - negative ROS and normal exam  (+) hypertension    ECG reviewed    Neuro/Psych - negative ROS     GI/Hepatic/Renal - negative ROS     Endo/Other - negative ROS   (+) diabetes mellitus  Abdominal  - normal exam                 Anesthesia Plan:   ASA:  2  Plan:   MAC  Post-op Pain Management: IV analgesics  Informed Consent Plan and Risks Discussed With:  Patient  Use of Blood Products Discussed With:  Patient  Discussed plan with:  Attending      I have informed Zenia Mcduffie and/or legal guardian or family member of the nature of the anesthetic plan, benefits, risks including possible dental damage if relevant, major complications, and any alternative forms of anesthetic management.   All of the patient's questions were answered to the best of my ability. The patient desires the anesthetic management as planned.  Liane Mccall MD  3/6/2025 7:18 AM  Present on Admission:  **None**           [1]   Medications Prior to Admission   Medication Sig Dispense Refill Last Dose/Taking    Insulin  Lispro, 1 Unit Dial, (ADMELOG SOLOSTAR) 100 UNIT/ML Subcutaneous Solution Pen-injector Inject into the skin 3 (three) times daily with meals. SLIDING SCALE       amLODIPine 5 MG Oral Tab Take 1 tablet (5 mg total) by mouth at bedtime.   3/5/2025    Finerenone (KERENDIA OR) Take 10 mg by mouth daily.   3/5/2025    atorvastatin 40 MG Oral Tab Take 1 tablet (40 mg total) by mouth nightly.   3/5/2025    Turmeric 500 MG Oral Cap Take 1,000 mg by mouth daily.   3/4/2025    MAGNESIUM OXIDE 400 MG Oral Tab TAKE 1 TABLET BY MOUTH EVERY DAY 90 tablet 1 3/4/2025    FOLBEE 2.5-25-1 MG Oral Tab TAKE 1 TABLET BY MOUTH EVERY DAY 90 tablet 1 3/5/2025    OLMESARTAN MEDOXOMIL 40 MG Oral Tab TAKE 1 TABLET BY MOUTH EVERY DAY (Patient taking differently: Take 1 tablet (40 mg total) by mouth every morning.) 90 tablet 1 3/6/2025 Morning    LEVOTHYROXINE 75 MCG Oral Tab TAKE 1 TABLET BY MOUTH EVERY DAY 90 tablet 1 3/5/2025    HYDROCHLOROTHIAZIDE 25 MG Oral Tab TAKE 1 TABLET BY MOUTH EVERY DAY 90 tablet 1 3/6/2025 Morning    OMEGA-3-ACID ETHYL ESTERS 1 g Oral Cap TAKE 4 CAPSULES (4 G TOTAL) BY MOUTH DAILY. (Patient taking differently: Take 2 capsules (2 g total) by mouth 2 (two) times daily.) 360 capsule 1 3/4/2025    Insulin Degludec (TRESIBA) 100 UNIT/ML Subcutaneous Solution Inject 11 Units into the skin every evening.  0 3/5/2025    Cholecalciferol (HM VITAMIN D3) 4000 units Oral Cap Take 1 capsule by mouth every 7 days.   3/4/2025    Vitamin B-12 1000 MCG Oral Tab Take 1 tablet (1,000 mcg total) by mouth daily.   3/4/2025    Biotin (BIOTIN 5000) 5 MG Oral Cap Take 5,000 mg by mouth daily.   3/4/2025    Multiple Vitamins-Minerals (MULTI FOR HER) Oral Cap Take 1 capsule by mouth.   3/4/2025    Insulin Pen Needle (BD PEN NEEDLE MINI U/F) 31G X 5 MM Does not apply Misc USE 4 TIMES DAILY.. Insulin Dependent Diabetes Type II. Z79.4, E11.9. 400 each 3     ACCU-CHEK COMPACT PLUS In Vitro Strip by Finger stick route As Directed. Use 6-8 times  daily  1    [2]   Current Facility-Administered Medications Ordered in Epic   Medication Dose Route Frequency Provider Last Rate Last Admin    lactated ringers infusion   Intravenous Continuous Chucky Nunez MD        levoFLOXacin in dextrose 5% (Levaquin) 500 mg/100mL IVPB premix 500 mg  500 mg Intravenous Once Chucky Nunez MD         No current [facility]-ordered outpatient medications on file.   [3]   Allergies  Allergen Reactions    Gluten Flour UNKNOWN    Seasonal OTHER (SEE COMMENTS)

## 2025-03-06 NOTE — OPERATIVE REPORT
[facility] OPERATIVE REPORT   PATIENT NAME: Zenia Mcduffie  MRN: [id number]  DATE OF OPERATION: 3/6/2025  PREOPERATIVE DIAGNOSIS: pancreatic uncinate cyst with villous appearing nodule- path c/w IPMN without dysplasia; here for surveillance   POSTOPERATIVE DIAGNOSIS:    1.  EUS  - 1.5cm cystic lesion with 1cm villous appearing- cyst communicating with main PD  - Prominent PD in neck measuring 4.7mm  2.  EGD  - Pancreas rest in gastric antrum  PROCEDURE PERFORMED: upper endoscopy/ ENDOSCOPIC ultrasound  SEDATION MEDICATIONS: MAC  PREOPERATIVE MEDICATIONS:  PREPROCEDURE ASSESSMENT: The indication for this procedure is to assess for cysts. The patient was identified by myself and nursing staff in the exam room. Informed consent was obtained. The patient was seen in clinic and a full H&P was obtained. On brief physical examination, airway is patent. Chest is clear. Heart has regular rate and rhythm. Abdomen is soft, nontender with good bowel sounds. A medication list was taken by nursing today and reviewed by myself. The patient is an ASA grade 2.   PROCEDURE NOTE: The procedure was completed without difficulty. The patient tolerated the procedure well.   Upper endoscopy (EGD):  The endoscope was inserted through the mouth and advanced to the level of the duodenum, 3rd portion.  Visualized portion of the esophagus, stomach including antrum, body, fundus and cardiac, and duodenum were normal except for a centrally depressed subepithelial lesion in antrum classic for pancreas rest.  Endoscopic ultrasound (EUS):  Endoscopic ultrasound was performed using the linear echoendoscope.  Images were obtained.    LIVER: Left lobe of the liver was visualized and no mass or lesions seen.  No intrahepatic duct dilation was noted.  Portal vein was noted to come out of the liver and the portal confluence was seen and pancreas was noted.   PANCREAS:  Pancreatic neck, body, and tail were interrogated from the gastric body while  the neck, head and uncinate were examined from the 1st and 2nd duodenum.  PD- normal except for prominence in the neck  Pancreas divisum:  no  Chronic pancreatitis changes:  no  Neoplasm: no   Cysts:   yes   - uncinate: cystic lesion was noted with centrally villous appearing mass.  This was previously biopsied with FNB needle and with forceps ([place] forceps).  Path was IPMN without dysplasia.  No associated mass was noted. No pancreatic duct obstriction was noted.  Cyst was communicating with a small PD branch to main PD in head of pancreas  Biliary Tree: normal   - stones:  no  GALLBLADDER: not visualized   CELIAC AXIS:  visualized without lymphadenopathy  L ADRENAL GLAND:  visualized   L KIDNEY:  visualized   MEDIASTINUM:  visualized without lymphadenopathy  Scope was withdrawn from the patient and patient tolerated the procedure well.  FINDINGS   IPMN with mural nodule previously biopsied - negative for dysplasia.  No new solid mass was noted.  RECOMMENDATIONS: close observation.  Will perform repeat MRI in 6 months. We discussed referral to pancreatic surgeon but patient does not want to see since she would not consider pancreatic surgery at this time.  DISCHARGE:  On discharge, the patient was given an after-visit summary detailing the procedure, findings, followup plans, and an updated medication list.     Thank you very much for the consultation.  I really appreciate it.    Chucky Nunez MD

## 2025-03-06 NOTE — ANESTHESIA POSTPROCEDURE EVALUATION
Patient: Zenia Mcduffie    Procedure Summary       Date: 03/06/25 Room / Location: [facility] ENDOSCOPY 01 / [facility] ENDOSCOPY    Anesthesia Start: 0755 Anesthesia Stop: 0812    Procedure: ENDOSCOPIC ULTRASOUND WITH FINE NEEDLE ASPIRATION Diagnosis:       Pancreas cyst (HCC)      (Pancreatic cyst)    Surgeons: Chucky Nunez MD Anesthesiologist: Liane Mccall MD    Anesthesia Type: MAC ASA Status: 2            Anesthesia Type: MAC    Vitals Value Taken Time   /56 03/06/25 0820   Temp 97 03/06/25 0823   Pulse 65 03/06/25 0822   Resp 16 03/06/25 0822   SpO2 96 % 03/06/25 0822   Vitals shown include unfiled device data.    [facility] AN Post Evaluation:   Patient Evaluated in PACU  Patient Participation: complete - patient participated  Level of Consciousness: awake  Pain Score: 2  Airway Patency:patent  Yes    Nausea/Vomiting: none  Cardiovascular Status: acceptable  Respiratory Status: acceptable  Postoperative Hydration acceptable      Liane Mccall MD  3/6/2025 8:23 AM

## 2025-03-06 NOTE — DISCHARGE INSTRUCTIONS
Home Care Instructions for Endoscopic Ultrasound    Diet:  - Resume your regular diet as tolerated unless otherwise instructed.  - Start with light meals to minimize bloating.  - Do not drink alcohol today.    Medication:  - If you have questions about resuming your normal medications, please contact your Primary Care Physician.    Activities:  - Take it easy today. Do not return to work today.  - Do not drive today.  - Do not operate any machinery today (including kitchen equipment).      Gastroscopy:  - You may have a sore throat for 2-3 days following the exam. This is normal. Gargling with warm salt water (1/2 tsp salt to 1 glass warm water) or using throat lozenges will help.  - If you experience any sharp pain in your neck, abdomen or chest, vomiting of blood, oral temperature over 100 degrees Fahrenheit, light-headedness or dizziness, or any other problems, contact your doctor.    **If unable to reach your doctor, please go to the [facility] Emergency Room**    - Your referring physician will receive a full report of your examination.  - If you do not hear from your doctor's office within two weeks of your biopsy, please call them for your results.    You may be able to see your laboratory results in [facility] between 4 and 7 business days.  In some cases, your physician may not have viewed the results before they are released to [facility].  If you have questions regarding your results contact the physician who ordered the test/exam by phone or via [facility] by choosing \"Ask a Medical Question.\"

## 2025-03-07 VITALS
WEIGHT: 125 LBS | RESPIRATION RATE: 17 BRPM | BODY MASS INDEX: 22.15 KG/M2 | OXYGEN SATURATION: 97 % | DIASTOLIC BLOOD PRESSURE: 62 MMHG | HEART RATE: 62 BPM | SYSTOLIC BLOOD PRESSURE: 112 MMHG | HEIGHT: 63 IN

## 2025-03-12 NOTE — H&P
History & Physical Examination    Patient Name: Zenia Mcduffie  MRN: [id number]  [facility]: [id number]  YOB: 1950    Diagnosis: Pancreas cyst (HCC)       Present Illness:  Zenia Mcduffie is a 75 year old female is here Pancreas cyst (HCC).    Body mass index is 22.14 kg/m².    Past Medical History:    Basal cell carcinoma    Celiac disease (HCC)    Disorder of thyroid    Essential hypertension    Hearing decreased, bilateral    bilat hearing aides    Hearing impaired person, bilateral    HEARING AIDS    High blood pressure    High cholesterol    Hyperchloremia    Hyperlipidemia    Hypothyroid    Microscopic colitis    PONV (postoperative nausea and vomiting)    Solitary kidney    Solitary kidney, congenital    Type 1 diabetes mellitus (HCC)    sees Dr. Tinajero, with retinopathy       Procedure: EUS    Physician Pre-Sedation Assessment    Pre-Sedation Assessment:    Sedation History: Airway Assessed    ASA Classification: 2. Patient with mild systemic disease    Cardiac:    Respiratory:    Abdomen:      Plan: MAC        No current facility-administered medications for this encounter.       Allergies: Allergies[1]    Past Surgical History:   Procedure Laterality Date          Colonoscopy      Needle biopsy left Left         Stapedectomy       Family History   Problem Relation Age of Onset    Ovarian Cancer Maternal Grandmother         70s    Breast Cancer Neg      Social History     Tobacco Use    Smoking status: Never    Smokeless tobacco: Never   Substance Use Topics    Alcohol use: Not Currently     Comment: very rare       SYSTEM Check if Review is Normal Check if Physical Exam is Normal If not normal, please explain:   HEENT [x ] [x ]    NECK & BACK [x ] [x ]    HEART [x ] [x ]    LUNGS [x ] [x ]    ABDOMEN [x ] [x ]    UROGENITAL [ ] [ ]    EXTREMITIES [ ] [ ]    OTHER        [ x ] I have discussed the risks and benefits and alternatives with the patient/family.  They understand and  agree to proceed with plan of care.  [ x ] I have reviewed the History and Physical done within the last 30 days.  Any changes noted above.    Chucky Nunez MD  3/12/2025  3:37 PM             [1]   Allergies  Allergen Reactions    Gluten Flour UNKNOWN    Seasonal OTHER (SEE COMMENTS)

## (undated) DEVICE — 60 ML SYRINGE REGULAR TIP: Brand: MONOJECT

## (undated) DEVICE — GUIDEWIRE: Brand: AMPLATZ SUPER STIFF

## (undated) DEVICE — KIT CLEAN ENDOKIT 1.1OZ GOWNX2

## (undated) DEVICE — ENDOSCOPIC VALVE WITH ADAPTER.: Brand: SURSEAL® II

## (undated) DEVICE — CONMED SCOPE SAVER BITE BLOCK, 20X27 MM: Brand: SCOPE SAVER

## (undated) DEVICE — PACK CUSTOM CYSTO

## (undated) DEVICE — ECHOTIP ACUCORE EUS BIOPSY NEEDLE: Brand: ECHOTIP ACUCORE

## (undated) DEVICE — UROLOGY DRAIN BAG

## (undated) DEVICE — SOLUTION IRRIG 3000ML 0.9% NACL FLX CONT

## (undated) DEVICE — ZIPWIRE GUIDEWIRE .035X150 STR

## (undated) DEVICE — MEDI-VAC NON-CONDUCTIVE SUCTION TUBING: Brand: CARDINAL HEALTH

## (undated) DEVICE — Device

## (undated) DEVICE — MEDI-VAC NON-CONDUCTIVE SUCTION TUBING 6MM X 1.8M (6FT.) L: Brand: CARDINAL HEALTH

## (undated) DEVICE — KIT MFLD FOR SPEC COLL

## (undated) DEVICE — SNAP KOVER: Brand: UNBRANDED

## (undated) DEVICE — CONMED DISPOSABLE BRONCHIAL CYTOLOGY BRUSH, STRAIGHT HANDLE, 3 MM X 120 CM: Brand: CONMED

## (undated) DEVICE — GUIDEWIRE ENDOSCP L150CM DIA0.035IN TIP 3CM

## (undated) DEVICE — HYDROGEL COATED URETERAL DILATOR: Brand: NOTTINGHAM ONE-STEP

## (undated) DEVICE — SOLUTION IRRIG 1000ML ST H2O AQUALITE PLAS

## (undated) DEVICE — CO2 CANNULA,SSOFT,ADLT,7O2,4CO2,FEMALE: Brand: MEDLINE

## (undated) DEVICE — URETERAL STENT
Type: IMPLANTABLE DEVICE | Status: NON-FUNCTIONAL
Brand: ASCERTA™

## (undated) DEVICE — CATHETER URET 5FR L70CM FLX OPN TIP NONPORTED

## (undated) DEVICE — GAMMEX® PI HYBRID SIZE 6, STERILE POWDER-FREE SURGICAL GLOVE, POLYISOPRENE AND NEOPRENE BLEND: Brand: GAMMEX

## (undated) DEVICE — SYRINGE, LUER SLIP, STERILE, 60ML: Brand: MEDLINE

## (undated) DEVICE — YANKAUER,BULB TIP,W/O VENT,RIGID,STERILE: Brand: MEDLINE

## (undated) DEVICE — BALLOON HEMOSTATIC EUS LINEAR

## (undated) DEVICE — SOLUTION IRRIG 1000ML 0.9% NACL USP BTL

## (undated) DEVICE — ECHOTIP ULTRA, ENDOSCOPIC ULTRASOUND NEEDLE: Brand: ECHOTIP

## (undated) DEVICE — V2 SPECIMEN COLLECTION MANIFOLD KIT: Brand: NEPTUNE

## (undated) DEVICE — SLEEVE COMPR MD KNEE LEN SGL USE KENDALL SCD

## (undated) NOTE — ED AVS SNAPSHOT
Malena Connor   MRN: [id number]    Department:  [facility] Emergency Department   Date of Visit:  10/25/2019           Disclosure     Insurance plans vary and the physician(s) referred by the ER may not be covered by your plan.  Please conta within the next three months to obtain basic health screening including reassessment of your blood pressure.     IF THERE IS ANY CHANGE OR WORSENING OF YOUR CONDITION, CALL YOUR PRIMARY CARE PHYSICIAN AT ONCE OR RETURN IMMEDIATELY TO THE EMERGENCY DEPARTMEN

## (undated) NOTE — LETTER
[facility]  [address], [place], [place]    Authorization for Surgical Operation and Procedure                               I hereby authorize Chucky Nunez MD, my physician and his/her assistants (if applicable), which may include medical students, residents, and/or fellows, to perform the following surgical operation/ procedure and administer such anesthesia as may be determined necessary by my physician: Operation/Procedure name (s) ENDOSCOPIC ULTRASOUND WITH FINE NEEDLE ASPIRATION on Zenia Mcduffie   2.   I recognize that during the surgical operation/procedure, unforeseen conditions may necessitate additional or different procedures than those listed above.  I, therefore, further authorize and request that the above-named surgeon, assistants, or designees perform such procedures as are, in their judgment, necessary and desirable.    3.   My surgeon/physician has discussed prior to my surgery the potential benefits, risks and side effects of this procedure; the likelihood of achieving goals; and potential problems that might occur during recuperation.  They also discussed reasonable alternatives to the procedure, including risks, benefits, and side effects related to the alternatives and risks related to not receiving this procedure.  I have had all my questions answered and I acknowledge that no guarantee has been made as to the result that may be obtained.    4.   Should the need arise during my operation/procedure, which includes change of level of care prior to discharge, I also consent to the administration of blood and/or blood products.  Further, I understand that despite careful testing and screening of blood or blood products by collecting agencies, I may still be subject to ill effects as a result of receiving a blood transfusion and/or blood products.  The following are some, but not all, of the potential risks that can occur: fever and allergic reactions, hemolytic  reactions, transmission of diseases such as Hepatitis, AIDS and Cytomegalovirus (CMV) and fluid overload.  In the event that I wish to have an autologous transfusion of my own blood, or a directed donor transfusion, I will discuss this with my physician.  Check only if Refusing Blood or Blood Products  I understand refusal of blood or blood products as deemed necessary by my physician may have serious consequences to my condition to include possible death. I hereby assume responsibility for my refusal and release the hospital, its personnel, and my physicians from any responsibility for the consequences of my refusal.    o  Refuse   5.   I authorize the use of any specimen, organs, tissues, body parts or foreign objects that may be removed from my body during the operation/procedure for diagnosis, research or teaching purposes and their subsequent disposal by hospital authorities.  I also authorize the release of specimen test results and/or written reports to my treating physician on the hospital medical staff or other referring or consulting physicians involved in my care, at the discretion of the Pathologist or my treating physician.    6.   I consent to the photographing or videotaping of the operations or procedures to be performed, including appropriate portions of my body for medical, scientific, or educational purposes, provided my identity is not revealed by the pictures or by descriptive texts accompanying them.  If the procedure has been photographed/videotaped, the surgeon will obtain the original picture, image, videotape or CD.  The hospital will not be responsible for storage, release or maintenance of the picture, image, tape or CD.    7.   I consent to the presence of a  or observers in the operating room as deemed necessary by my physician or their designees.    8.   I recognize that in the event my procedure results in extended X-Ray/fluoroscopy time, I may develop a skin  reaction.    9. If I have a Do Not Attempt Resuscitation (DNAR) order in place, that status will be suspended while in the operating room, procedural suite, and during the recovery period unless otherwise explicitly stated by me (or a person authorized to consent on my behalf). The surgeon or my attending physician will determine when the applicable recovery period ends for purposes of reinstating the DNAR order.  10. Patients having a sterilization procedure: I understand that if the procedure is successful the results will be permanent and it will therefore be impossible for me to inseminate, conceive, or bear children.  I also understand that the procedure is intended to result in sterility, although the result has not been guaranteed.   11. I acknowledge that my physician has explained sedation/analgesia administration to me including the risk and benefits I consent to the administration of sedation/analgesia as may be necessary or desirable in the judgment of my physician.    I CERTIFY THAT I HAVE READ AND FULLY UNDERSTAND THE ABOVE CONSENT TO OPERATION and/or OTHER PROCEDURE.     ____________________________________  _________________________________        ______________________________  Signature of Patient    Signature of Responsible Person                Printed Name of Responsible Person                                      ____________________________________  _____________________________                ________________________________  Signature of Witness        Date  Time         Relationship to Patient    STATEMENT OF PHYSICIAN My signature below affirms that prior to the time of the procedure; I have explained to the patient and/or his/her legal representative, the risks and benefits involved in the proposed treatment and any reasonable alternative to the proposed treatment. I have also explained the risks and benefits involved in refusal of the proposed treatment and alternatives to the proposed  treatment and have answered the patient's questions. If I have a significant financial interest in a co-management agreement or a significant financial interest in any product or implant, or other significant relationship used in this procedure/surgery, I have disclosed this and had a discussion with my patient.     _____________________________________________________              _____________________________  (Signature of Physician)                                                                                         (Date)                                   (Time)  Patient Name: Zenia JIMENEZ Froy      : 1950      Printed: 3/3/2025     Medical Record #: [id number]                                      Page 1 of 1

## (undated) NOTE — LETTER
[facility]  Administration of Anesthesia  I, Zenia Mcduffie agree to be cared for by a physician anesthesiologist alone and/or with a nurse anesthetist, who is specially trained to monitor me and give me medicine to put me to sleep or keep me comfortable during my procedure    I understand that my anesthesiologist and/or anesthetist is not an employee or agent of [facility] or [employer]. He or she works for [facility]    As the patient asking for anesthesia services, I agree to:  Allow the anesthesiologist (anesthesia doctor) to give me medicine and do additional procedures as necessary. Some examples are: Starting or using an “IV” to give me medicine, fluids or blood during my procedure, and having a breathing tube placed to help me breathe when I’m asleep (intubation). In the event that my heart stops working properly, I understand that my anesthesiologist will make every effort to sustain my life, unless otherwise directed by [facility] Do Not Resuscitate documents.  Tell my anesthesia doctor before my procedure:  If I am pregnant.  The last time that I ate or drank.  iii. All of the medicines I take (including prescriptions, herbal supplements, and pills I can buy without a prescription (including street drugs/illegal medications). Failure to inform my anesthesiologist about these medicines may increase my risk of anesthetic complications.  iv.If I am allergic to anything or have had a reaction to anesthesia before.  I understand how the anesthesia medicine will help me (benefits).  I understand that with any type of anesthesia medicine there are risks:  The most common risks are: nausea, vomiting, sore throat, muscle soreness, damage to my eyes, mouth, or teeth (from breathing tube placement).  Rare risks include: remembering what happened during my procedure, allergic reactions to medications, injury to my airway, heart, lungs, vision, nerves, or  muscles and in extremely rare instances death.  My doctor has explained to me other choices available to me for my care (alternatives).  Pregnant Patients (“epidural”):  I understand that the risks of having an epidural (medicine given into my back to help control pain during labor), include itching, low blood pressure, difficulty urinating, headache or slowing of the baby’s heart. Very rare risks include infection, bleeding, seizure, irregular heart rhythms and nerve injury.  Regional Anesthesia (“spinal”, “epidural”, & “nerve blocks”):  I understand that rare but potential complications include headache, bleeding, infection, seizure, irregular heart rhythms, and nerve injury.    _____________________________________________________________________________  Patient (or Representative) Signature/Relationship to Patient  Date   Time    _____________________________________________________________________________   Name (if used)    Language/Organization   Time    _____________________________________________________________________________  Nurse Anesthetist Signature     Date   Time  _____________________________________________________________________________  Anesthesiologist Signature     Date   Time  I have discussed the procedure and information above with the patient (or patient’s representative) and answered their questions. The patient or their representative has agreed to have anesthesia services.    _____________________________________________________________________________  Witness        Date   Time  I have verified that the signature is that of the patient or patient’s representative, and that it was signed before the procedure  Patient Name: Zenia Mcduffie     : 1950                 Printed: 2024 at 4:39 PM    Medical Record #: [id number]                                            Page 1 of 1  ----------ANESTHESIA CONSENT----------

## (undated) NOTE — LETTER
[facility]  [address], [place], [place]    Authorization for Surgical Operation and Procedure                               I hereby authorize Chucky Nunez MD, my physician and his/her assistants (if applicable), which may include medical students, residents, and/or fellows, to perform the following surgical operation/ procedure and administer such anesthesia as may be determined necessary by my physician: Operation/Procedure name (s) ENDOSCOPIC ULTRASOUND WITH FINE NEEDLE ASPIRATION on Zenia Mcduffie   2.   I recognize that during the surgical operation/procedure, unforeseen conditions may necessitate additional or different procedures than those listed above.  I, therefore, further authorize and request that the above-named surgeon, assistants, or designees perform such procedures as are, in their judgment, necessary and desirable.    3.   My surgeon/physician has discussed prior to my surgery the potential benefits, risks and side effects of this procedure; the likelihood of achieving goals; and potential problems that might occur during recuperation.  They also discussed reasonable alternatives to the procedure, including risks, benefits, and side effects related to the alternatives and risks related to not receiving this procedure.  I have had all my questions answered and I acknowledge that no guarantee has been made as to the result that may be obtained.    4.   Should the need arise during my operation/procedure, which includes change of level of care prior to discharge, I also consent to the administration of blood and/or blood products.  Further, I understand that despite careful testing and screening of blood or blood products by collecting agencies, I may still be subject to ill effects as a result of receiving a blood transfusion and/or blood products.  The following are some, but not all, of the potential risks that can occur: fever and allergic reactions, hemolytic  reactions, transmission of diseases such as Hepatitis, AIDS and Cytomegalovirus (CMV) and fluid overload.  In the event that I wish to have an autologous transfusion of my own blood, or a directed donor transfusion, I will discuss this with my physician.  Check only if Refusing Blood or Blood Products  I understand refusal of blood or blood products as deemed necessary by my physician may have serious consequences to my condition to include possible death. I hereby assume responsibility for my refusal and release the hospital, its personnel, and my physicians from any responsibility for the consequences of my refusal.    o  Refuse   5.   I authorize the use of any specimen, organs, tissues, body parts or foreign objects that may be removed from my body during the operation/procedure for diagnosis, research or teaching purposes and their subsequent disposal by hospital authorities.  I also authorize the release of specimen test results and/or written reports to my treating physician on the hospital medical staff or other referring or consulting physicians involved in my care, at the discretion of the Pathologist or my treating physician.    6.   I consent to the photographing or videotaping of the operations or procedures to be performed, including appropriate portions of my body for medical, scientific, or educational purposes, provided my identity is not revealed by the pictures or by descriptive texts accompanying them.  If the procedure has been photographed/videotaped, the surgeon will obtain the original picture, image, videotape or CD.  The hospital will not be responsible for storage, release or maintenance of the picture, image, tape or CD.    7.   I consent to the presence of a  or observers in the operating room as deemed necessary by my physician or their designees.    8.   I recognize that in the event my procedure results in extended X-Ray/fluoroscopy time, I may develop a skin  reaction.    9. If I have a Do Not Attempt Resuscitation (DNAR) order in place, that status will be suspended while in the operating room, procedural suite, and during the recovery period unless otherwise explicitly stated by me (or a person authorized to consent on my behalf). The surgeon or my attending physician will determine when the applicable recovery period ends for purposes of reinstating the DNAR order.  10. Patients having a sterilization procedure: I understand that if the procedure is successful the results will be permanent and it will therefore be impossible for me to inseminate, conceive, or bear children.  I also understand that the procedure is intended to result in sterility, although the result has not been guaranteed.   11. I acknowledge that my physician has explained sedation/analgesia administration to me including the risk and benefits I consent to the administration of sedation/analgesia as may be necessary or desirable in the judgment of my physician.    I CERTIFY THAT I HAVE READ AND FULLY UNDERSTAND THE ABOVE CONSENT TO OPERATION and/or OTHER PROCEDURE.     ____________________________________  _________________________________        ______________________________  Signature of Patient    Signature of Responsible Person                Printed Name of Responsible Person                                      ____________________________________  _____________________________                ________________________________  Signature of Witness        Date  Time         Relationship to Patient    STATEMENT OF PHYSICIAN My signature below affirms that prior to the time of the procedure; I have explained to the patient and/or his/her legal representative, the risks and benefits involved in the proposed treatment and any reasonable alternative to the proposed treatment. I have also explained the risks and benefits involved in refusal of the proposed treatment and alternatives to the proposed  treatment and have answered the patient's questions. If I have a significant financial interest in a co-management agreement or a significant financial interest in any product or implant, or other significant relationship used in this procedure/surgery, I have disclosed this and had a discussion with my patient.     _____________________________________________________              _____________________________  (Signature of Physician)                                                                                         (Date)                                   (Time)  Patient Name: Zenia JIMENEZ Froy      : 1950      Printed: 2024     Medical Record #: [id number]                                      Page 1 of 1

## (undated) NOTE — LETTER
[facility]  Administration of Anesthesia  I, Zenia Mcduffie agree to be cared for by a physician anesthesiologist alone and/or with a nurse anesthetist, who is specially trained to monitor me and give me medicine to put me to sleep or keep me comfortable during my procedure    I understand that my anesthesiologist and/or anesthetist is not an employee or agent of [facility] or [employer]. He or she works for [facility]    As the patient asking for anesthesia services, I agree to:  Allow the anesthesiologist (anesthesia doctor) to give me medicine and do additional procedures as necessary. Some examples are: Starting or using an “IV” to give me medicine, fluids or blood during my procedure, and having a breathing tube placed to help me breathe when I’m asleep (intubation). In the event that my heart stops working properly, I understand that my anesthesiologist will make every effort to sustain my life, unless otherwise directed by [facility] Do Not Resuscitate documents.  Tell my anesthesia doctor before my procedure:  If I am pregnant.  The last time that I ate or drank.  iii. All of the medicines I take (including prescriptions, herbal supplements, and pills I can buy without a prescription (including street drugs/illegal medications). Failure to inform my anesthesiologist about these medicines may increase my risk of anesthetic complications.  iv.If I am allergic to anything or have had a reaction to anesthesia before.  I understand how the anesthesia medicine will help me (benefits).  I understand that with any type of anesthesia medicine there are risks:  The most common risks are: nausea, vomiting, sore throat, muscle soreness, damage to my eyes, mouth, or teeth (from breathing tube placement).  Rare risks include: remembering what happened during my procedure, allergic reactions to medications, injury to my airway, heart, lungs, vision, nerves, or  muscles and in extremely rare instances death.  My doctor has explained to me other choices available to me for my care (alternatives).  Pregnant Patients (“epidural”):  I understand that the risks of having an epidural (medicine given into my back to help control pain during labor), include itching, low blood pressure, difficulty urinating, headache or slowing of the baby’s heart. Very rare risks include infection, bleeding, seizure, irregular heart rhythms and nerve injury.  Regional Anesthesia (“spinal”, “epidural”, & “nerve blocks”):  I understand that rare but potential complications include headache, bleeding, infection, seizure, irregular heart rhythms, and nerve injury.    _____________________________________________________________________________  Patient (or Representative) Signature/Relationship to Patient  Date   Time    _____________________________________________________________________________   Name (if used)    Language/Organization   Time    _____________________________________________________________________________  Nurse Anesthetist Signature     Date   Time  _____________________________________________________________________________  Anesthesiologist Signature     Date   Time  I have discussed the procedure and information above with the patient (or patient’s representative) and answered their questions. The patient or their representative has agreed to have anesthesia services.    _____________________________________________________________________________  Witness        Date   Time  I have verified that the signature is that of the patient or patient’s representative, and that it was signed before the procedure  Patient Name: Zenia Mcduffie     : 1950                 Printed: 3/3/2025 at 11:26 AM    Medical Record #: [id number]                                            Page 1 of 1  ----------ANESTHESIA CONSENT----------